# Patient Record
Sex: MALE | NOT HISPANIC OR LATINO | URBAN - METROPOLITAN AREA
[De-identification: names, ages, dates, MRNs, and addresses within clinical notes are randomized per-mention and may not be internally consistent; named-entity substitution may affect disease eponyms.]

---

## 2023-07-11 LAB
ALANINE AMINOTRANSFERASE (SGPT) (U/L) IN SER/PLAS: 37 U/L (ref 10–52)
ALBUMIN (G/DL) IN SER/PLAS: 4.4 G/DL (ref 3.4–5)
ALKALINE PHOSPHATASE (U/L) IN SER/PLAS: 79 U/L (ref 33–120)
ANION GAP IN SER/PLAS: 15 MMOL/L (ref 10–20)
ASPARTATE AMINOTRANSFERASE (SGOT) (U/L) IN SER/PLAS: 21 U/L (ref 9–39)
BASOPHILS (10*3/UL) IN BLOOD BY AUTOMATED COUNT: 0.06 X10E9/L (ref 0–0.1)
BASOPHILS/100 LEUKOCYTES IN BLOOD BY AUTOMATED COUNT: 1 % (ref 0–2)
BILIRUBIN TOTAL (MG/DL) IN SER/PLAS: 0.6 MG/DL (ref 0–1.2)
C REACTIVE PROTEIN (MG/L) IN SER/PLAS: 0.25 MG/DL
CALCIUM (MG/DL) IN SER/PLAS: 9.2 MG/DL (ref 8.6–10.3)
CARBON DIOXIDE, TOTAL (MMOL/L) IN SER/PLAS: 23 MMOL/L (ref 21–32)
CHLORIDE (MMOL/L) IN SER/PLAS: 100 MMOL/L (ref 98–107)
CHOLESTEROL (MG/DL) IN SER/PLAS: 201 MG/DL (ref 0–199)
CHOLESTEROL IN HDL (MG/DL) IN SER/PLAS: 50.4 MG/DL
CHOLESTEROL/HDL RATIO: 4
CREATININE (MG/DL) IN SER/PLAS: 1.02 MG/DL (ref 0.5–1.3)
EOSINOPHILS (10*3/UL) IN BLOOD BY AUTOMATED COUNT: 0.27 X10E9/L (ref 0–0.7)
EOSINOPHILS/100 LEUKOCYTES IN BLOOD BY AUTOMATED COUNT: 4.5 % (ref 0–6)
ERYTHROCYTE DISTRIBUTION WIDTH (RATIO) BY AUTOMATED COUNT: 13.1 % (ref 11.5–14.5)
ERYTHROCYTE MEAN CORPUSCULAR HEMOGLOBIN CONCENTRATION (G/DL) BY AUTOMATED: 32.1 G/DL (ref 32–36)
ERYTHROCYTE MEAN CORPUSCULAR VOLUME (FL) BY AUTOMATED COUNT: 81 FL (ref 80–100)
ERYTHROCYTES (10*6/UL) IN BLOOD BY AUTOMATED COUNT: 4.78 X10E12/L (ref 4.5–5.9)
FERRITIN (UG/LL) IN SER/PLAS: 160 UG/L (ref 20–300)
GFR MALE: 90 ML/MIN/1.73M2
GLUCOSE (MG/DL) IN SER/PLAS: 318 MG/DL (ref 74–99)
HEMATOCRIT (%) IN BLOOD BY AUTOMATED COUNT: 38.6 % (ref 41–52)
HEMOGLOBIN (G/DL) IN BLOOD: 12.4 G/DL (ref 13.5–17.5)
IMMATURE GRANULOCYTES/100 LEUKOCYTES IN BLOOD BY AUTOMATED COUNT: 0.2 % (ref 0–0.9)
LDL: 130 MG/DL (ref 0–99)
LEUKOCYTES (10*3/UL) IN BLOOD BY AUTOMATED COUNT: 6 X10E9/L (ref 4.4–11.3)
LYMPHOCYTES (10*3/UL) IN BLOOD BY AUTOMATED COUNT: 1.89 X10E9/L (ref 1.2–4.8)
LYMPHOCYTES/100 LEUKOCYTES IN BLOOD BY AUTOMATED COUNT: 31.4 % (ref 13–44)
MONOCYTES (10*3/UL) IN BLOOD BY AUTOMATED COUNT: 0.47 X10E9/L (ref 0.1–1)
MONOCYTES/100 LEUKOCYTES IN BLOOD BY AUTOMATED COUNT: 7.8 % (ref 2–10)
NATRIURETIC PEPTIDE B (PG/ML) IN SER/PLAS: 8 PG/ML (ref 0–99)
NEUTROPHILS (10*3/UL) IN BLOOD BY AUTOMATED COUNT: 3.31 X10E9/L (ref 1.2–7.7)
NEUTROPHILS/100 LEUKOCYTES IN BLOOD BY AUTOMATED COUNT: 55.1 % (ref 40–80)
PLATELETS (10*3/UL) IN BLOOD AUTOMATED COUNT: 247 X10E9/L (ref 150–450)
POTASSIUM (MMOL/L) IN SER/PLAS: 4.9 MMOL/L (ref 3.5–5.3)
PROTEIN TOTAL: 6.7 G/DL (ref 6.4–8.2)
SEDIMENTATION RATE, ERYTHROCYTE: 18 MM/H (ref 0–15)
SODIUM (MMOL/L) IN SER/PLAS: 133 MMOL/L (ref 136–145)
THYROTROPIN (MIU/L) IN SER/PLAS BY DETECTION LIMIT <= 0.05 MIU/L: 0.79 MIU/L (ref 0.44–3.98)
THYROXINE (T4) FREE (NG/DL) IN SER/PLAS: 0.94 NG/DL (ref 0.61–1.12)
TRIGLYCERIDE (MG/DL) IN SER/PLAS: 102 MG/DL (ref 0–149)
UREA NITROGEN (MG/DL) IN SER/PLAS: 23 MG/DL (ref 6–23)
VLDL: 20 MG/DL (ref 0–40)

## 2023-07-12 LAB
ANTI-NUCLEAR ANTIBODY (ANA): NEGATIVE
CALCIDIOL (25 OH VITAMIN D3) (NG/ML) IN SER/PLAS: 25 NG/ML
COBALAMIN (VITAMIN B12) (PG/ML) IN SER/PLAS: 514 PG/ML (ref 211–911)
EPSTEIN-BARR VCA IGG: NEGATIVE
EPSTEIN-BARR VCA IGM: NEGATIVE
ESTIMATED AVERAGE GLUCOSE FOR HBA1C: 329 MG/DL
HEMOGLOBIN A1C/HEMOGLOBIN TOTAL IN BLOOD: 13.1 %
PROSTATE SPECIFIC AG (NG/ML) IN SER/PLAS: 0.28 NG/ML (ref 0–4)
STREPTOLYSIN O AB (IU/ML) IN SER/PLAS: 109 IU/ML (ref 0–200)
TESTOSTERONE (NG/DL) IN SER/PLAS: 148 NG/DL (ref 240–1000)

## 2024-08-05 ENCOUNTER — LAB REQUISITION (OUTPATIENT)
Dept: LAB | Facility: HOSPITAL | Age: 49
End: 2024-08-05
Payer: MEDICARE

## 2024-08-05 DIAGNOSIS — G40.911 EPILEPSY, UNSPECIFIED, INTRACTABLE, WITH STATUS EPILEPTICUS (MULTI): ICD-10-CM

## 2024-08-05 DIAGNOSIS — R00.2 PALPITATIONS: ICD-10-CM

## 2024-08-05 DIAGNOSIS — E11.9 TYPE 2 DIABETES MELLITUS WITHOUT COMPLICATIONS (MULTI): ICD-10-CM

## 2024-08-05 DIAGNOSIS — I10 ESSENTIAL (PRIMARY) HYPERTENSION: ICD-10-CM

## 2024-08-05 DIAGNOSIS — E78.00 PURE HYPERCHOLESTEROLEMIA, UNSPECIFIED: ICD-10-CM

## 2024-08-05 LAB
ALBUMIN SERPL BCP-MCNC: 4.3 G/DL (ref 3.4–5)
ALP SERPL-CCNC: 81 U/L (ref 33–120)
ALT SERPL W P-5'-P-CCNC: 42 U/L (ref 10–52)
ANION GAP SERPL CALC-SCNC: 18 MMOL/L (ref 10–20)
AST SERPL W P-5'-P-CCNC: 28 U/L (ref 9–39)
BASOPHILS # BLD AUTO: 0.06 X10*3/UL (ref 0–0.1)
BASOPHILS NFR BLD AUTO: 0.7 %
BILIRUB SERPL-MCNC: 0.7 MG/DL (ref 0–1.2)
BNP SERPL-MCNC: 19 PG/ML (ref 0–99)
BUN SERPL-MCNC: 19 MG/DL (ref 6–23)
CALCIUM SERPL-MCNC: 9.2 MG/DL (ref 8.6–10.3)
CHLORIDE SERPL-SCNC: 98 MMOL/L (ref 98–107)
CHOLEST SERPL-MCNC: 213 MG/DL (ref 0–199)
CHOLESTEROL/HDL RATIO: 4.1
CK SERPL-CCNC: 116 U/L (ref 0–325)
CO2 SERPL-SCNC: 23 MMOL/L (ref 21–32)
CREAT SERPL-MCNC: 1.06 MG/DL (ref 0.5–1.3)
EGFRCR SERPLBLD CKD-EPI 2021: 85 ML/MIN/1.73M*2
EOSINOPHIL # BLD AUTO: 0.28 X10*3/UL (ref 0–0.7)
EOSINOPHIL NFR BLD AUTO: 3.3 %
ERYTHROCYTE [DISTWIDTH] IN BLOOD BY AUTOMATED COUNT: 14 % (ref 11.5–14.5)
FERRITIN SERPL-MCNC: 179 NG/ML (ref 20–300)
GLUCOSE SERPL-MCNC: 245 MG/DL (ref 74–99)
HCT VFR BLD AUTO: 38.1 % (ref 41–52)
HDLC SERPL-MCNC: 52.3 MG/DL
HGB BLD-MCNC: 12.4 G/DL (ref 13.5–17.5)
HOLD SPECIMEN: NORMAL
HOLD SPECIMEN: NORMAL
IMM GRANULOCYTES # BLD AUTO: 0.02 X10*3/UL (ref 0–0.7)
IMM GRANULOCYTES NFR BLD AUTO: 0.2 % (ref 0–0.9)
IRON SATN MFR SERPL: 20 % (ref 25–45)
IRON SERPL-MCNC: 71 UG/DL (ref 35–150)
LYMPHOCYTES # BLD AUTO: 2.2 X10*3/UL (ref 1.2–4.8)
LYMPHOCYTES NFR BLD AUTO: 26 %
MCH RBC QN AUTO: 26.6 PG (ref 26–34)
MCHC RBC AUTO-ENTMCNC: 32.5 G/DL (ref 32–36)
MCV RBC AUTO: 82 FL (ref 80–100)
MONOCYTES # BLD AUTO: 0.62 X10*3/UL (ref 0.1–1)
MONOCYTES NFR BLD AUTO: 7.3 %
NEUTROPHILS # BLD AUTO: 5.28 X10*3/UL (ref 1.2–7.7)
NEUTROPHILS NFR BLD AUTO: 62.5 %
NON-HDL CHOLESTEROL: 161 MG/DL (ref 0–149)
NRBC BLD-RTO: 0 /100 WBCS (ref 0–0)
PLATELET # BLD AUTO: 259 X10*3/UL (ref 150–450)
POTASSIUM SERPL-SCNC: 4.1 MMOL/L (ref 3.5–5.3)
PROT SERPL-MCNC: 6.6 G/DL (ref 6.4–8.2)
RBC # BLD AUTO: 4.67 X10*6/UL (ref 4.5–5.9)
SODIUM SERPL-SCNC: 135 MMOL/L (ref 136–145)
T4 FREE SERPL-MCNC: 1.05 NG/DL (ref 0.61–1.12)
TIBC SERPL-MCNC: 364 UG/DL (ref 240–445)
TSH SERPL-ACNC: 0.66 MIU/L (ref 0.44–3.98)
UIBC SERPL-MCNC: 293 UG/DL (ref 110–370)
WBC # BLD AUTO: 8.5 X10*3/UL (ref 4.4–11.3)

## 2024-08-05 PROCEDURE — 83718 ASSAY OF LIPOPROTEIN: CPT

## 2024-08-05 PROCEDURE — 83880 ASSAY OF NATRIURETIC PEPTIDE: CPT

## 2024-08-05 PROCEDURE — 84439 ASSAY OF FREE THYROXINE: CPT

## 2024-08-05 PROCEDURE — 83540 ASSAY OF IRON: CPT

## 2024-08-05 PROCEDURE — 83550 IRON BINDING TEST: CPT

## 2024-08-05 PROCEDURE — 80053 COMPREHEN METABOLIC PANEL: CPT

## 2024-08-05 PROCEDURE — 82552 ASSAY OF CPK IN BLOOD: CPT

## 2024-08-05 PROCEDURE — 85025 COMPLETE CBC W/AUTO DIFF WBC: CPT

## 2024-08-05 PROCEDURE — 82550 ASSAY OF CK (CPK): CPT

## 2024-08-05 PROCEDURE — 83036 HEMOGLOBIN GLYCOSYLATED A1C: CPT

## 2024-08-05 PROCEDURE — 86060 ANTISTREPTOLYSIN O TITER: CPT

## 2024-08-05 PROCEDURE — 84443 ASSAY THYROID STIM HORMONE: CPT

## 2024-08-05 PROCEDURE — 82465 ASSAY BLD/SERUM CHOLESTEROL: CPT

## 2024-08-05 PROCEDURE — 82728 ASSAY OF FERRITIN: CPT

## 2024-08-06 LAB
ASO AB SERPL-ACNC: 99 IU/ML (ref 0–250)
EST. AVERAGE GLUCOSE BLD GHB EST-MCNC: 292 MG/DL
HBA1C MFR BLD: 11.8 %

## 2024-08-08 LAB
CK BB CFR SERPL ELPH: 0 % (ref 0–0)
CK MACRO1 CFR SERPL: 0 % (ref 0–0)
CK MACRO2 CFR SERPL: 0 % (ref 0–0)
CK MB CFR SERPL ELPH: 0 % (ref 0–4)
CK MM CFR SERPL ELPH: 100 % (ref 96–100)
CK SERPL-CCNC: 124 U/L (ref 39–308)

## 2025-06-28 ENCOUNTER — APPOINTMENT (OUTPATIENT)
Dept: CARDIOLOGY | Facility: HOSPITAL | Age: 50
DRG: 309 | End: 2025-06-28
Payer: MEDICARE

## 2025-06-28 ENCOUNTER — APPOINTMENT (OUTPATIENT)
Dept: RADIOLOGY | Facility: HOSPITAL | Age: 50
DRG: 309 | End: 2025-06-28
Payer: MEDICARE

## 2025-06-28 ENCOUNTER — HOSPITAL ENCOUNTER (INPATIENT)
Facility: HOSPITAL | Age: 50
DRG: 309 | End: 2025-06-28
Attending: STUDENT IN AN ORGANIZED HEALTH CARE EDUCATION/TRAINING PROGRAM | Admitting: INTERNAL MEDICINE
Payer: MEDICARE

## 2025-06-28 DIAGNOSIS — E11.9 TYPE 2 DIABETES MELLITUS WITHOUT COMPLICATION, WITHOUT LONG-TERM CURRENT USE OF INSULIN: ICD-10-CM

## 2025-06-28 DIAGNOSIS — I48.19 ATRIAL FIBRILLATION, PERSISTENT (MULTI): ICD-10-CM

## 2025-06-28 DIAGNOSIS — T67.5XXA HEAT EXHAUSTION, INITIAL ENCOUNTER: ICD-10-CM

## 2025-06-28 DIAGNOSIS — I48.0 AF (PAROXYSMAL ATRIAL FIBRILLATION) (MULTI): ICD-10-CM

## 2025-06-28 DIAGNOSIS — I48.91 ATRIAL FIBRILLATION WITH RVR (MULTI): ICD-10-CM

## 2025-06-28 DIAGNOSIS — I48.11 LONGSTANDING PERSISTENT ATRIAL FIBRILLATION (MULTI): ICD-10-CM

## 2025-06-28 DIAGNOSIS — I48.91 ATRIAL FIBRILLATION WITH RAPID VENTRICULAR RESPONSE (MULTI): Primary | ICD-10-CM

## 2025-06-28 LAB
ALBUMIN SERPL BCP-MCNC: 4.7 G/DL (ref 3.4–5)
ALP SERPL-CCNC: 72 U/L (ref 33–120)
ALT SERPL W P-5'-P-CCNC: 51 U/L (ref 10–52)
ANION GAP BLDV CALCULATED.4IONS-SCNC: 15 MMOL/L (ref 10–25)
ANION GAP SERPL CALC-SCNC: 22 MMOL/L (ref 10–20)
AST SERPL W P-5'-P-CCNC: 31 U/L (ref 9–39)
BASE EXCESS BLDV CALC-SCNC: -1.1 MMOL/L (ref -2–3)
BASOPHILS # BLD AUTO: 0.07 X10*3/UL (ref 0–0.1)
BASOPHILS NFR BLD AUTO: 0.7 %
BILIRUB SERPL-MCNC: 0.6 MG/DL (ref 0–1.2)
BNP SERPL-MCNC: 86 PG/ML (ref 0–99)
BODY TEMPERATURE: ABNORMAL
BUN SERPL-MCNC: 16 MG/DL (ref 6–23)
CA-I BLDV-SCNC: 1.19 MMOL/L (ref 1.1–1.33)
CALCIUM SERPL-MCNC: 9.9 MG/DL (ref 8.6–10.3)
CARDIAC TROPONIN I PNL SERPL HS: 17 NG/L (ref 0–20)
CARDIAC TROPONIN I PNL SERPL HS: 23 NG/L (ref 0–20)
CHLORIDE BLDV-SCNC: 103 MMOL/L (ref 98–107)
CHLORIDE SERPL-SCNC: 101 MMOL/L (ref 98–107)
CO2 SERPL-SCNC: 18 MMOL/L (ref 21–32)
CREAT SERPL-MCNC: 1.03 MG/DL (ref 0.5–1.3)
EGFRCR SERPLBLD CKD-EPI 2021: 88 ML/MIN/1.73M*2
EOSINOPHIL # BLD AUTO: 0.25 X10*3/UL (ref 0–0.7)
EOSINOPHIL NFR BLD AUTO: 2.6 %
ERYTHROCYTE [DISTWIDTH] IN BLOOD BY AUTOMATED COUNT: 13.5 % (ref 11.5–14.5)
GLUCOSE BLDV-MCNC: 236 MG/DL (ref 74–99)
GLUCOSE SERPL-MCNC: 203 MG/DL (ref 74–99)
HCO3 BLDV-SCNC: 22.2 MMOL/L (ref 22–26)
HCT VFR BLD AUTO: 36.9 % (ref 41–52)
HCT VFR BLD EST: 37 % (ref 41–52)
HGB BLD-MCNC: 12.5 G/DL (ref 13.5–17.5)
HGB BLDV-MCNC: 12.4 G/DL (ref 13.5–17.5)
IMM GRANULOCYTES # BLD AUTO: 0.02 X10*3/UL (ref 0–0.7)
IMM GRANULOCYTES NFR BLD AUTO: 0.2 % (ref 0–0.9)
INHALED O2 CONCENTRATION: 21 %
LACTATE BLDV-SCNC: 2.2 MMOL/L (ref 0.4–2)
LIPASE SERPL-CCNC: 52 U/L (ref 9–82)
LYMPHOCYTES # BLD AUTO: 2.53 X10*3/UL (ref 1.2–4.8)
LYMPHOCYTES NFR BLD AUTO: 26.1 %
MCH RBC QN AUTO: 26.8 PG (ref 26–34)
MCHC RBC AUTO-ENTMCNC: 33.9 G/DL (ref 32–36)
MCV RBC AUTO: 79 FL (ref 80–100)
MONOCYTES # BLD AUTO: 0.78 X10*3/UL (ref 0.1–1)
MONOCYTES NFR BLD AUTO: 8 %
NEUTROPHILS # BLD AUTO: 6.04 X10*3/UL (ref 1.2–7.7)
NEUTROPHILS NFR BLD AUTO: 62.4 %
NRBC BLD-RTO: 0 /100 WBCS (ref 0–0)
OXYHGB MFR BLDV: 79.1 % (ref 45–75)
PCO2 BLDV: 32 MM HG (ref 41–51)
PH BLDV: 7.45 PH (ref 7.33–7.43)
PLATELET # BLD AUTO: 268 X10*3/UL (ref 150–450)
PO2 BLDV: 44 MM HG (ref 35–45)
POTASSIUM BLDV-SCNC: 4.1 MMOL/L (ref 3.5–5.3)
POTASSIUM SERPL-SCNC: 3.7 MMOL/L (ref 3.5–5.3)
PROT SERPL-MCNC: 7.1 G/DL (ref 6.4–8.2)
RBC # BLD AUTO: 4.67 X10*6/UL (ref 4.5–5.9)
SAO2 % BLDV: 81 % (ref 45–75)
SODIUM BLDV-SCNC: 136 MMOL/L (ref 136–145)
SODIUM SERPL-SCNC: 137 MMOL/L (ref 136–145)
WBC # BLD AUTO: 9.7 X10*3/UL (ref 4.4–11.3)

## 2025-06-28 PROCEDURE — 93005 ELECTROCARDIOGRAM TRACING: CPT

## 2025-06-28 PROCEDURE — 71275 CT ANGIOGRAPHY CHEST: CPT

## 2025-06-28 PROCEDURE — 84484 ASSAY OF TROPONIN QUANT: CPT

## 2025-06-28 PROCEDURE — 72125 CT NECK SPINE W/O DYE: CPT

## 2025-06-28 PROCEDURE — 36415 COLL VENOUS BLD VENIPUNCTURE: CPT

## 2025-06-28 PROCEDURE — 84132 ASSAY OF SERUM POTASSIUM: CPT

## 2025-06-28 PROCEDURE — 70450 CT HEAD/BRAIN W/O DYE: CPT | Performed by: RADIOLOGY

## 2025-06-28 PROCEDURE — 71275 CT ANGIOGRAPHY CHEST: CPT | Performed by: RADIOLOGY

## 2025-06-28 PROCEDURE — 72125 CT NECK SPINE W/O DYE: CPT | Performed by: RADIOLOGY

## 2025-06-28 PROCEDURE — 70450 CT HEAD/BRAIN W/O DYE: CPT

## 2025-06-28 PROCEDURE — 2550000001 HC RX 255 CONTRASTS: Performed by: STUDENT IN AN ORGANIZED HEALTH CARE EDUCATION/TRAINING PROGRAM

## 2025-06-28 PROCEDURE — 74177 CT ABD & PELVIS W/CONTRAST: CPT

## 2025-06-28 PROCEDURE — 2500000004 HC RX 250 GENERAL PHARMACY W/ HCPCS (ALT 636 FOR OP/ED)

## 2025-06-28 PROCEDURE — 96375 TX/PRO/DX INJ NEW DRUG ADDON: CPT

## 2025-06-28 PROCEDURE — 84075 ASSAY ALKALINE PHOSPHATASE: CPT

## 2025-06-28 PROCEDURE — 99285 EMERGENCY DEPT VISIT HI MDM: CPT | Mod: 25 | Performed by: STUDENT IN AN ORGANIZED HEALTH CARE EDUCATION/TRAINING PROGRAM

## 2025-06-28 PROCEDURE — 96361 HYDRATE IV INFUSION ADD-ON: CPT

## 2025-06-28 PROCEDURE — 83880 ASSAY OF NATRIURETIC PEPTIDE: CPT

## 2025-06-28 PROCEDURE — 96374 THER/PROPH/DIAG INJ IV PUSH: CPT

## 2025-06-28 PROCEDURE — 83690 ASSAY OF LIPASE: CPT

## 2025-06-28 PROCEDURE — 74177 CT ABD & PELVIS W/CONTRAST: CPT | Performed by: RADIOLOGY

## 2025-06-28 PROCEDURE — 84484 ASSAY OF TROPONIN QUANT: CPT | Performed by: STUDENT IN AN ORGANIZED HEALTH CARE EDUCATION/TRAINING PROGRAM

## 2025-06-28 PROCEDURE — 85025 COMPLETE CBC W/AUTO DIFF WBC: CPT

## 2025-06-28 RX ORDER — METOPROLOL TARTRATE 1 MG/ML
5 INJECTION, SOLUTION INTRAVENOUS
Status: DISPENSED | OUTPATIENT
Start: 2025-06-28 | End: 2025-06-28

## 2025-06-28 RX ORDER — HEPARIN SODIUM 10000 [USP'U]/100ML
0-4500 INJECTION, SOLUTION INTRAVENOUS CONTINUOUS
Status: DISCONTINUED | OUTPATIENT
Start: 2025-06-28 | End: 2025-06-30 | Stop reason: HOSPADM

## 2025-06-28 RX ORDER — MORPHINE SULFATE 4 MG/ML
4 INJECTION INTRAVENOUS ONCE
Status: COMPLETED | OUTPATIENT
Start: 2025-06-28 | End: 2025-06-28

## 2025-06-28 RX ADMIN — IOHEXOL 75 ML: 350 INJECTION, SOLUTION INTRAVENOUS at 21:17

## 2025-06-28 RX ADMIN — METOPROLOL TARTRATE 5 MG: 5 INJECTION INTRAVENOUS at 20:37

## 2025-06-28 RX ADMIN — MORPHINE SULFATE 4 MG: 4 INJECTION INTRAVENOUS at 21:39

## 2025-06-28 RX ADMIN — SODIUM CHLORIDE, POTASSIUM CHLORIDE, SODIUM LACTATE AND CALCIUM CHLORIDE 1000 ML: 600; 310; 30; 20 INJECTION, SOLUTION INTRAVENOUS at 20:27

## 2025-06-28 ASSESSMENT — PAIN SCALES - GENERAL
PAINLEVEL_OUTOF10: 3
PAINLEVEL_OUTOF10: 7

## 2025-06-28 ASSESSMENT — PAIN DESCRIPTION - DESCRIPTORS
DESCRIPTORS: TIGHTNESS
DESCRIPTORS: TIGHTNESS

## 2025-06-28 ASSESSMENT — PAIN DESCRIPTION - PROGRESSION: CLINICAL_PROGRESSION: GRADUALLY IMPROVING

## 2025-06-28 ASSESSMENT — PAIN - FUNCTIONAL ASSESSMENT: PAIN_FUNCTIONAL_ASSESSMENT: 0-10

## 2025-06-28 ASSESSMENT — PAIN DESCRIPTION - PAIN TYPE: TYPE: ACUTE PAIN

## 2025-06-28 ASSESSMENT — PAIN DESCRIPTION - LOCATION: LOCATION: CHEST

## 2025-06-29 VITALS
TEMPERATURE: 96.8 F | HEIGHT: 70 IN | HEART RATE: 84 BPM | SYSTOLIC BLOOD PRESSURE: 142 MMHG | DIASTOLIC BLOOD PRESSURE: 85 MMHG | WEIGHT: 227.8 LBS | OXYGEN SATURATION: 96 % | BODY MASS INDEX: 32.61 KG/M2 | RESPIRATION RATE: 18 BRPM

## 2025-06-29 PROBLEM — I48.91 ATRIAL FIBRILLATION WITH RAPID VENTRICULAR RESPONSE (MULTI): Status: ACTIVE | Noted: 2025-06-29

## 2025-06-29 PROBLEM — I48.91 ATRIAL FIBRILLATION WITH RVR (MULTI): Status: ACTIVE | Noted: 2025-06-29

## 2025-06-29 LAB
ANION GAP SERPL CALC-SCNC: 13 MMOL/L (ref 10–20)
BUN SERPL-MCNC: 15 MG/DL (ref 6–23)
CALCIUM SERPL-MCNC: 8.8 MG/DL (ref 8.6–10.3)
CARDIAC TROPONIN I PNL SERPL HS: 28 NG/L (ref 0–20)
CARDIAC TROPONIN I PNL SERPL HS: 30 NG/L (ref 0–20)
CHLORIDE SERPL-SCNC: 104 MMOL/L (ref 98–107)
CO2 SERPL-SCNC: 25 MMOL/L (ref 21–32)
CREAT SERPL-MCNC: 0.89 MG/DL (ref 0.5–1.3)
EGFRCR SERPLBLD CKD-EPI 2021: >90 ML/MIN/1.73M*2
ERYTHROCYTE [DISTWIDTH] IN BLOOD BY AUTOMATED COUNT: 13.5 % (ref 11.5–14.5)
EST. AVERAGE GLUCOSE BLD GHB EST-MCNC: 192 MG/DL
GLUCOSE BLD MANUAL STRIP-MCNC: 177 MG/DL (ref 74–99)
GLUCOSE BLD MANUAL STRIP-MCNC: 180 MG/DL (ref 74–99)
GLUCOSE BLD MANUAL STRIP-MCNC: 268 MG/DL (ref 74–99)
GLUCOSE SERPL-MCNC: 131 MG/DL (ref 74–99)
HBA1C MFR BLD: 8.3 % (ref ?–5.7)
HCT VFR BLD AUTO: 35.1 % (ref 41–52)
HGB BLD-MCNC: 12 G/DL (ref 13.5–17.5)
HOLD SPECIMEN: NORMAL
INR PPP: 1.1 (ref 0.9–1.1)
LACTATE BLDV-SCNC: 1.1 MMOL/L (ref 0.4–2)
MAGNESIUM SERPL-MCNC: 1.55 MG/DL (ref 1.6–2.4)
MCH RBC QN AUTO: 27.4 PG (ref 26–34)
MCHC RBC AUTO-ENTMCNC: 34.2 G/DL (ref 32–36)
MCV RBC AUTO: 80 FL (ref 80–100)
NRBC BLD-RTO: 0 /100 WBCS (ref 0–0)
PLATELET # BLD AUTO: 230 X10*3/UL (ref 150–450)
POTASSIUM SERPL-SCNC: 3.3 MMOL/L (ref 3.5–5.3)
PROTHROMBIN TIME: 12.1 SECONDS (ref 9.8–12.4)
RBC # BLD AUTO: 4.38 X10*6/UL (ref 4.5–5.9)
SODIUM SERPL-SCNC: 139 MMOL/L (ref 136–145)
TSH SERPL-ACNC: 1.83 MIU/L (ref 0.44–3.98)
UFH PPP CHRO-ACNC: 0.1 IU/ML (ref ?–1.1)
UFH PPP CHRO-ACNC: 0.3 IU/ML (ref ?–1.1)
UFH PPP CHRO-ACNC: 0.5 IU/ML (ref ?–1.1)
UFH PPP CHRO-ACNC: 0.8 IU/ML (ref ?–1.1)
UFH PPP CHRO-ACNC: 1.3 IU/ML (ref ?–1.1)
WBC # BLD AUTO: 9.3 X10*3/UL (ref 4.4–11.3)

## 2025-06-29 PROCEDURE — 2500000004 HC RX 250 GENERAL PHARMACY W/ HCPCS (ALT 636 FOR OP/ED): Performed by: INTERNAL MEDICINE

## 2025-06-29 PROCEDURE — 36415 COLL VENOUS BLD VENIPUNCTURE: CPT | Performed by: INTERNAL MEDICINE

## 2025-06-29 PROCEDURE — 84443 ASSAY THYROID STIM HORMONE: CPT | Performed by: INTERNAL MEDICINE

## 2025-06-29 PROCEDURE — 83735 ASSAY OF MAGNESIUM: CPT | Performed by: INTERNAL MEDICINE

## 2025-06-29 PROCEDURE — 2500000002 HC RX 250 W HCPCS SELF ADMINISTERED DRUGS (ALT 637 FOR MEDICARE OP, ALT 636 FOR OP/ED): Performed by: INTERNAL MEDICINE

## 2025-06-29 PROCEDURE — 85610 PROTHROMBIN TIME: CPT | Performed by: STUDENT IN AN ORGANIZED HEALTH CARE EDUCATION/TRAINING PROGRAM

## 2025-06-29 PROCEDURE — 99223 1ST HOSP IP/OBS HIGH 75: CPT | Performed by: INTERNAL MEDICINE

## 2025-06-29 PROCEDURE — 83605 ASSAY OF LACTIC ACID: CPT | Performed by: INTERNAL MEDICINE

## 2025-06-29 PROCEDURE — 85520 HEPARIN ASSAY: CPT | Performed by: INTERNAL MEDICINE

## 2025-06-29 PROCEDURE — 2500000001 HC RX 250 WO HCPCS SELF ADMINISTERED DRUGS (ALT 637 FOR MEDICARE OP): Performed by: INTERNAL MEDICINE

## 2025-06-29 PROCEDURE — 1200000002 HC GENERAL ROOM WITH TELEMETRY DAILY

## 2025-06-29 PROCEDURE — 2500000004 HC RX 250 GENERAL PHARMACY W/ HCPCS (ALT 636 FOR OP/ED): Performed by: STUDENT IN AN ORGANIZED HEALTH CARE EDUCATION/TRAINING PROGRAM

## 2025-06-29 PROCEDURE — 84484 ASSAY OF TROPONIN QUANT: CPT | Performed by: INTERNAL MEDICINE

## 2025-06-29 PROCEDURE — 83036 HEMOGLOBIN GLYCOSYLATED A1C: CPT | Mod: GEALAB | Performed by: INTERNAL MEDICINE

## 2025-06-29 PROCEDURE — 96365 THER/PROPH/DIAG IV INF INIT: CPT

## 2025-06-29 PROCEDURE — 82947 ASSAY GLUCOSE BLOOD QUANT: CPT

## 2025-06-29 PROCEDURE — 85520 HEPARIN ASSAY: CPT | Performed by: NURSE PRACTITIONER

## 2025-06-29 PROCEDURE — 85027 COMPLETE CBC AUTOMATED: CPT | Performed by: INTERNAL MEDICINE

## 2025-06-29 PROCEDURE — 80048 BASIC METABOLIC PNL TOTAL CA: CPT | Performed by: INTERNAL MEDICINE

## 2025-06-29 PROCEDURE — 36415 COLL VENOUS BLD VENIPUNCTURE: CPT | Performed by: STUDENT IN AN ORGANIZED HEALTH CARE EDUCATION/TRAINING PROGRAM

## 2025-06-29 RX ORDER — ACETAMINOPHEN 325 MG/1
650 TABLET ORAL EVERY 6 HOURS PRN
Status: DISCONTINUED | OUTPATIENT
Start: 2025-06-29 | End: 2025-06-30 | Stop reason: HOSPADM

## 2025-06-29 RX ORDER — INSULIN LISPRO 100 [IU]/ML
0-10 INJECTION, SOLUTION INTRAVENOUS; SUBCUTANEOUS
Status: DISCONTINUED | OUTPATIENT
Start: 2025-06-29 | End: 2025-06-30 | Stop reason: HOSPADM

## 2025-06-29 RX ORDER — METOPROLOL TARTRATE 25 MG/1
25 TABLET, FILM COATED ORAL 2 TIMES DAILY
Status: DISCONTINUED | OUTPATIENT
Start: 2025-06-29 | End: 2025-06-30 | Stop reason: HOSPADM

## 2025-06-29 RX ORDER — DEXTROSE 50 % IN WATER (D50W) INTRAVENOUS SYRINGE
12.5
Status: DISCONTINUED | OUTPATIENT
Start: 2025-06-29 | End: 2025-06-30 | Stop reason: HOSPADM

## 2025-06-29 RX ORDER — DEXTROSE 50 % IN WATER (D50W) INTRAVENOUS SYRINGE
25
Status: DISCONTINUED | OUTPATIENT
Start: 2025-06-29 | End: 2025-06-30 | Stop reason: HOSPADM

## 2025-06-29 RX ORDER — MAGNESIUM SULFATE HEPTAHYDRATE 40 MG/ML
2 INJECTION, SOLUTION INTRAVENOUS ONCE
Status: COMPLETED | OUTPATIENT
Start: 2025-06-29 | End: 2025-06-29

## 2025-06-29 RX ADMIN — MAGNESIUM SULFATE HEPTAHYDRATE 2 G: 2 INJECTION, SOLUTION INTRAVENOUS at 09:49

## 2025-06-29 RX ADMIN — ACETAMINOPHEN 650 MG: 325 TABLET ORAL at 01:59

## 2025-06-29 RX ADMIN — HEPARIN SODIUM 2000 UNITS/HR: 10000 INJECTION, SOLUTION INTRAVENOUS at 00:38

## 2025-06-29 RX ADMIN — HEPARIN SODIUM 1700 UNITS/HR: 10000 INJECTION, SOLUTION INTRAVENOUS at 11:31

## 2025-06-29 RX ADMIN — INSULIN LISPRO 2 UNITS: 100 INJECTION, SOLUTION INTRAVENOUS; SUBCUTANEOUS at 16:51

## 2025-06-29 RX ADMIN — HEPARIN SODIUM 1700 UNITS/HR: 10000 INJECTION, SOLUTION INTRAVENOUS at 08:57

## 2025-06-29 RX ADMIN — INSULIN LISPRO 2 UNITS: 100 INJECTION, SOLUTION INTRAVENOUS; SUBCUTANEOUS at 20:27

## 2025-06-29 RX ADMIN — METOPROLOL TARTRATE 25 MG: 25 TABLET, FILM COATED ORAL at 09:49

## 2025-06-29 RX ADMIN — METOPROLOL TARTRATE 25 MG: 25 TABLET, FILM COATED ORAL at 20:27

## 2025-06-29 RX ADMIN — INSULIN LISPRO 6 UNITS: 100 INJECTION, SOLUTION INTRAVENOUS; SUBCUTANEOUS at 11:30

## 2025-06-29 SDOH — ECONOMIC STABILITY: TRANSPORTATION INSECURITY: IN THE PAST 12 MONTHS, HAS LACK OF TRANSPORTATION KEPT YOU FROM MEDICAL APPOINTMENTS OR FROM GETTING MEDICATIONS?: NO

## 2025-06-29 SDOH — SOCIAL STABILITY: SOCIAL INSECURITY: DO YOU FEEL ANYONE HAS EXPLOITED OR TAKEN ADVANTAGE OF YOU FINANCIALLY OR OF YOUR PERSONAL PROPERTY?: NO

## 2025-06-29 SDOH — ECONOMIC STABILITY: FOOD INSECURITY: WITHIN THE PAST 12 MONTHS, YOU WORRIED THAT YOUR FOOD WOULD RUN OUT BEFORE YOU GOT THE MONEY TO BUY MORE.: NEVER TRUE

## 2025-06-29 SDOH — SOCIAL STABILITY: SOCIAL INSECURITY: DOES ANYONE TRY TO KEEP YOU FROM HAVING/CONTACTING OTHER FRIENDS OR DOING THINGS OUTSIDE YOUR HOME?: NO

## 2025-06-29 SDOH — ECONOMIC STABILITY: INCOME INSECURITY: IN THE PAST 12 MONTHS HAS THE ELECTRIC, GAS, OIL, OR WATER COMPANY THREATENED TO SHUT OFF SERVICES IN YOUR HOME?: NO

## 2025-06-29 SDOH — ECONOMIC STABILITY: FOOD INSECURITY: HOW HARD IS IT FOR YOU TO PAY FOR THE VERY BASICS LIKE FOOD, HOUSING, MEDICAL CARE, AND HEATING?: PATIENT DECLINED

## 2025-06-29 SDOH — SOCIAL STABILITY: SOCIAL INSECURITY: HAS ANYONE EVER THREATENED TO HURT YOUR FAMILY OR YOUR PETS?: NO

## 2025-06-29 SDOH — ECONOMIC STABILITY: HOUSING INSECURITY: IN THE LAST 12 MONTHS, WAS THERE A TIME WHEN YOU WERE NOT ABLE TO PAY THE MORTGAGE OR RENT ON TIME?: PATIENT DECLINED

## 2025-06-29 SDOH — SOCIAL STABILITY: SOCIAL INSECURITY
WITHIN THE LAST YEAR, HAVE YOU BEEN RAPED OR FORCED TO HAVE ANY KIND OF SEXUAL ACTIVITY BY YOUR PARTNER OR EX-PARTNER?: NO

## 2025-06-29 SDOH — ECONOMIC STABILITY: HOUSING INSECURITY: AT ANY TIME IN THE PAST 12 MONTHS, WERE YOU HOMELESS OR LIVING IN A SHELTER (INCLUDING NOW)?: NO

## 2025-06-29 SDOH — SOCIAL STABILITY: SOCIAL INSECURITY: WITHIN THE LAST YEAR, HAVE YOU BEEN HUMILIATED OR EMOTIONALLY ABUSED IN OTHER WAYS BY YOUR PARTNER OR EX-PARTNER?: NO

## 2025-06-29 SDOH — SOCIAL STABILITY: SOCIAL INSECURITY
WITHIN THE LAST YEAR, HAVE YOU BEEN KICKED, HIT, SLAPPED, OR OTHERWISE PHYSICALLY HURT BY YOUR PARTNER OR EX-PARTNER?: NO

## 2025-06-29 SDOH — ECONOMIC STABILITY: HOUSING INSECURITY: IN THE PAST 12 MONTHS, HOW MANY TIMES HAVE YOU MOVED WHERE YOU WERE LIVING?: 1

## 2025-06-29 SDOH — ECONOMIC STABILITY: FOOD INSECURITY: WITHIN THE PAST 12 MONTHS, THE FOOD YOU BOUGHT JUST DIDN'T LAST AND YOU DIDN'T HAVE MONEY TO GET MORE.: NEVER TRUE

## 2025-06-29 SDOH — SOCIAL STABILITY: SOCIAL INSECURITY: WITHIN THE LAST YEAR, HAVE YOU BEEN AFRAID OF YOUR PARTNER OR EX-PARTNER?: NO

## 2025-06-29 SDOH — SOCIAL STABILITY: SOCIAL INSECURITY: HAVE YOU HAD THOUGHTS OF HARMING ANYONE ELSE?: NO

## 2025-06-29 SDOH — SOCIAL STABILITY: SOCIAL INSECURITY: ABUSE: ADULT

## 2025-06-29 SDOH — SOCIAL STABILITY: SOCIAL INSECURITY: WERE YOU ABLE TO COMPLETE ALL THE BEHAVIORAL HEALTH SCREENINGS?: YES

## 2025-06-29 SDOH — SOCIAL STABILITY: SOCIAL INSECURITY: ARE THERE ANY APPARENT SIGNS OF INJURIES/BEHAVIORS THAT COULD BE RELATED TO ABUSE/NEGLECT?: NO

## 2025-06-29 SDOH — SOCIAL STABILITY: SOCIAL INSECURITY: HAVE YOU HAD ANY THOUGHTS OF HARMING ANYONE ELSE?: NO

## 2025-06-29 SDOH — SOCIAL STABILITY: SOCIAL INSECURITY: DO YOU FEEL UNSAFE GOING BACK TO THE PLACE WHERE YOU ARE LIVING?: NO

## 2025-06-29 SDOH — SOCIAL STABILITY: SOCIAL INSECURITY: ARE YOU OR HAVE YOU BEEN THREATENED OR ABUSED PHYSICALLY, EMOTIONALLY, OR SEXUALLY BY ANYONE?: NO

## 2025-06-29 ASSESSMENT — ACTIVITIES OF DAILY LIVING (ADL)
WALKS IN HOME: INDEPENDENT
JUDGMENT_ADEQUATE_SAFELY_COMPLETE_DAILY_ACTIVITIES: YES
HEARING - RIGHT EAR: DEAF
PATIENT'S MEMORY ADEQUATE TO SAFELY COMPLETE DAILY ACTIVITIES?: YES
LACK_OF_TRANSPORTATION: NO
ADEQUATE_TO_COMPLETE_ADL: YES
HEARING - LEFT EAR: DEAF
DRESSING YOURSELF: INDEPENDENT
GROOMING: INDEPENDENT
BATHING: INDEPENDENT
LACK_OF_TRANSPORTATION: NO
TOILETING: INDEPENDENT
LACK_OF_TRANSPORTATION: NO
FEEDING YOURSELF: INDEPENDENT

## 2025-06-29 ASSESSMENT — COGNITIVE AND FUNCTIONAL STATUS - GENERAL
MOBILITY SCORE: 24
PATIENT BASELINE BEDBOUND: NO
DAILY ACTIVITIY SCORE: 24

## 2025-06-29 ASSESSMENT — ENCOUNTER SYMPTOMS
NEUROLOGICAL NEGATIVE: 1
ALLERGIC/IMMUNOLOGIC NEGATIVE: 1
ENDOCRINE COMMENTS: PT IS DIABETIC
GASTROINTESTINAL NEGATIVE: 1
MUSCULOSKELETAL NEGATIVE: 1
PALPITATIONS: 1
PSYCHIATRIC NEGATIVE: 1
EYES NEGATIVE: 1
SHORTNESS OF BREATH: 1
DIAPHORESIS: 1
HEMATOLOGIC/LYMPHATIC NEGATIVE: 1

## 2025-06-29 ASSESSMENT — PATIENT HEALTH QUESTIONNAIRE - PHQ9
SUM OF ALL RESPONSES TO PHQ9 QUESTIONS 1 & 2: 0
1. LITTLE INTEREST OR PLEASURE IN DOING THINGS: NOT AT ALL
2. FEELING DOWN, DEPRESSED OR HOPELESS: NOT AT ALL

## 2025-06-29 ASSESSMENT — LIFESTYLE VARIABLES
HOW OFTEN DO YOU HAVE 6 OR MORE DRINKS ON ONE OCCASION: NEVER
HOW MANY STANDARD DRINKS CONTAINING ALCOHOL DO YOU HAVE ON A TYPICAL DAY: PATIENT DOES NOT DRINK
AUDIT-C TOTAL SCORE: 0
SKIP TO QUESTIONS 9-10: 1
AUDIT-C TOTAL SCORE: 0
HOW OFTEN DO YOU HAVE A DRINK CONTAINING ALCOHOL: NEVER

## 2025-06-29 ASSESSMENT — PAIN - FUNCTIONAL ASSESSMENT
PAIN_FUNCTIONAL_ASSESSMENT: 0-10
PAIN_FUNCTIONAL_ASSESSMENT: 0-10

## 2025-06-29 ASSESSMENT — PAIN SCALES - GENERAL
PAINLEVEL_OUTOF10: 0 - NO PAIN
PAINLEVEL_OUTOF10: 0 - NO PAIN

## 2025-06-29 NOTE — CARE PLAN
The patient's goals for the shift include      The clinical goals for the shift include no falls    Problem: Safety - Adult  Goal: Free from fall injury  Outcome: Progressing

## 2025-06-29 NOTE — CONSULTS
"Inpatient consult to Cardiology  Consult performed by: Lorenzo Pizarro MD  Consult ordered by: Robbin Tan MD  Reason for consult: atrial fibrillation        History Of Present Illness:    Taqueria Delgado is a 50 y.o. male presenting with chest discomfort found to be in new atrial fibrillation.  He has hearing loss and communicates via sign language, history of diabetes and epilepsy.  Apparently had been in his lawn and got dizzy and had some diaphoresis and chest pain.  Patient was brought to the ER and found to be in atrial fibrillation.  Denies any current chest pain or complaints.     Last Recorded Vitals:  Vitals:    06/29/25 0231 06/29/25 0750 06/29/25 0949 06/29/25 1229   BP:  131/87 108/70 128/85   BP Location:  Right arm  Left arm   Pulse:  86 (!) 116 63   Resp:  17  18   Temp:  35.9 °C (96.6 °F)  36.6 °C (97.9 °F)   TempSrc:  Temporal  Temporal   SpO2:  97%  96%   Weight: 103 kg (227 lb 12.8 oz)      Height: 1.778 m (5' 10\")          Last Labs:  CBC - 6/29/2025:  6:19 AM  9.3 12.0 230    35.1      CMP - 6/29/2025:  6:19 AM  8.8 7.1 31 --- 0.6   _ 4.7 51 72      PTT - No results in last year.  1.1   12.1 _     Troponin I, High Sensitivity   Date/Time Value Ref Range Status   06/29/2025 06:19 AM 30 (H) 0 - 20 ng/L Final   06/28/2025 11:52 PM 28 (H) 0 - 20 ng/L Final   06/28/2025 09:34 PM 23 (H) 0 - 20 ng/L Final     BNP   Date/Time Value Ref Range Status   06/28/2025 08:11 PM 86 0 - 99 pg/mL Final   08/05/2024 01:50 PM 19 0 - 99 pg/mL Final     Hemoglobin A1C   Date/Time Value Ref Range Status   08/05/2024 01:50 PM 11.8 (H) see below % Final   07/11/2023 12:00 PM 13.1 (A) % Final     Comment:          Diagnosis of Diabetes-Adults   Non-Diabetic: < or = 5.6%   Increased risk for developing diabetes: 5.7-6.4%   Diagnostic of diabetes: > or = 6.5%  .       Monitoring of Diabetes                Age (y)     Therapeutic Goal (%)   Adults:          >18           <7.0   Pediatrics:    13-18           " <7.5                   7-12           <8.0                   0- 6            7.5-8.5   American Diabetes Association. Diabetes Care 33(S1), Jan 2010.       VLDL   Date/Time Value Ref Range Status   07/11/2023 12:00 PM 20 0 - 40 mg/dL Final      Last I/O:  I/O last 3 completed shifts:  In: 999 (9.7 mL/kg) [IV Piggyback:999]  Out: - (0 mL/kg)   Weight: 103.3 kg     Past Cardiology Tests (Last 3 Years):  Echocardiogram independently reviewed from ER: Atrial fibrillation, rate 140      Past Medical History:  He has no past medical history on file.    Past Surgical History:  He has no past surgical history on file.      Social History:  He reports that he has never smoked. He has never used smokeless tobacco. No history on file for alcohol use and drug use.    Family History:  Family History[1]     Allergies:  Patient has no known allergies.    Inpatient Medications:  Scheduled Medications[2]  PRN Medications[3]  Continuous Medications[4]  Outpatient Medications:  No current outpatient medications    Physical Exam:  Constitutional:       Appearance: Healthy appearance. Not in distress.   Neck:      Vascular: No JVR. JVD normal.   Pulmonary:      Effort: Pulmonary effort is normal.      Breath sounds: Normal breath sounds. No wheezing. No rhonchi. No rales.   Chest:      Chest wall: Not tender to palpatation.   Cardiovascular:      Normal rate. Irregularly irregular rhythm.      Murmurs: There is no murmur.   Edema:     Peripheral edema absent.   Abdominal:      General: Bowel sounds are normal.      Palpations: Abdomen is soft.      Tenderness: There is no abdominal tenderness.   Musculoskeletal: Normal range of motion. Skin:     General: Skin is warm and dry.   Neurological:      General: No focal deficit present.      Mental Status: Alert and oriented to person, place and time.           Assessment/Plan   Pleasant 50-year-old gentleman with history of epilepsy and diabetes and hearing loss presenting with somatic new  onset atrial fibrillation.  Recommend IV heparin and continued beta-blocker: Plan for SHARON cardioversion in a.m. if does not self convert today.    Peripheral IV 06/28/25 20 G Right Antecubital (Active)   Site Assessment Clean;Dry;Intact 06/29/25 0900   Dressing Type Transparent 06/29/25 0900   Line Status Flushed;Blood return noted;Saline locked 06/29/25 0900   Dressing Status Clean;Dry;Occlusive 06/29/25 0900   Number of days: 1       Peripheral IV 06/28/25 20 G Left;Posterior Hand (Active)   Site Assessment Clean;Dry;Intact 06/29/25 0900   Dressing Type Transparent 06/29/25 0900   Line Status Flushed;Blood return noted;Infusing 06/29/25 0900   Dressing Status Clean;Dry;Occlusive 06/29/25 0900   Number of days: 1       Code Status:  Full Code            Lorenzo Pizarro MD       [1] No family history on file.  [2]   Scheduled medications   Medication Dose Route Frequency    insulin lispro  0-10 Units subcutaneous 4x daily    metoprolol tartrate  25 mg oral BID   [3]   PRN medications   Medication    acetaminophen    dextrose    dextrose    glucagon    glucagon    heparin   [4]   Continuous Medications   Medication Dose Last Rate    heparin  0-4,500 Units/hr 1,700 Units/hr (06/29/25 1327)

## 2025-06-29 NOTE — H&P
History Of Present Illness  Taqueria Delgado is a 50 y.o. male with hearing loss, epilepsy, and DM presenting with chest pain and a dizzy spell. History was provided by sister at the bedside due to pt's limited verbal and hearing abilities. He apparently was mowing the lawn when he suddenly developed a dizzy spell, with chest pain and diaphoresis. He then fell to the ground but was able to get up by himself. Neighbors saw him and observed that he was not OK so they called EMS on his behalf. Sister reports that he was SOB and mentioned having palpitations. No nausea or vomiting. No LOC. Pt was brought to the ED and was noted to be in new onset atrial fibrillation with VR of 140/min. He received IV metoprolol in ED and his HR dropped to the low 100s.      Past Medical History  Epilepsy  Hearing loss  Type II DM    Past Surgical History  None     Social History  [facility] male. Currently lives independently. Single. Denies tobacco, alcohol or recreational drug use    Family History  Positive for cardiomyopathy in maternal relatives and bicuspid aortic valve in father     Allergies  Patient has no allergy information on record.    Medications  No current outpatient medications      Review of Systems   Constitutional:  Positive for diaphoresis.   HENT:  Positive for hearing loss.    Eyes: Negative.    Respiratory:  Positive for shortness of breath.    Cardiovascular:  Positive for chest pain and palpitations.   Gastrointestinal: Negative.    Endocrine:        Pt is diabetic   Genitourinary: Negative.    Musculoskeletal: Negative.    Skin: Negative.    Allergic/Immunologic: Negative.    Neurological: Negative.    Hematological: Negative.    Psychiatric/Behavioral: Negative.          Physical Exam  Constitutional:       General: He is not in acute distress.     Appearance: He is obese. He is diaphoretic. He is not ill-appearing or toxic-appearing.   HENT:      Head: Normocephalic and atraumatic.      Nose: Nose normal.      " Mouth/Throat:      Mouth: Mucous membranes are moist.      Pharynx: Oropharynx is clear. No oropharyngeal exudate or posterior oropharyngeal erythema.   Eyes:      General: No scleral icterus.        Right eye: No discharge.         Left eye: No discharge.      Conjunctiva/sclera: Conjunctivae normal.   Cardiovascular:      Rate and Rhythm: Rhythm irregular.      Heart sounds: Murmur heard.      Comments: S1 and S2 irregularly irregular with soft systolic murmur  Pulmonary:      Breath sounds: No wheezing, rhonchi or rales.   Abdominal:      Palpations: Abdomen is soft. There is no mass.      Tenderness: There is abdominal tenderness. There is no right CVA tenderness, left CVA tenderness or guarding.   Musculoskeletal:      Cervical back: Neck supple.      Right lower leg: No edema.      Left lower leg: No edema.   Lymphadenopathy:      Cervical: No cervical adenopathy.   Skin:     General: Skin is warm.      Findings: No lesion or rash.   Neurological:      General: No focal deficit present.      Mental Status: He is alert and oriented to person, place, and time.   Psychiatric:         Mood and Affect: Mood normal.         Behavior: Behavior normal.          Last Recorded Vitals  Blood pressure 128/85, pulse (!) 103, temperature 36.1 °C (97 °F), temperature source Temporal, resp. rate (!) 22, height 1.778 m (5' 10\"), weight 116 kg (255 lb), SpO2 98%.    Relevant Results    Latest Reference Range & Units 06/28/25 20:11 06/28/25 20:26 06/28/25 21:34 06/28/25 23:52   GLUCOSE 74 - 99 mg/dL 203 (H)      SODIUM 136 - 145 mmol/L 137      POTASSIUM 3.5 - 5.3 mmol/L 3.7      CHLORIDE 98 - 107 mmol/L 101      Bicarbonate 21 - 32 mmol/L 18 (L)      Anion Gap 10 - 20 mmol/L 22 (H)      Blood Urea Nitrogen 6 - 23 mg/dL 16      Creatinine 0.50 - 1.30 mg/dL 1.03      EGFR >60 mL/min/1.73m*2 88      Calcium 8.6 - 10.3 mg/dL 9.9      Albumin 3.4 - 5.0 g/dL 4.7      Alkaline Phosphatase 33 - 120 U/L 72      ALT 10 - 52 U/L 51    "   AST 9 - 39 U/L 31      Bilirubin Total 0.0 - 1.2 mg/dL 0.6      Total Protein 6.4 - 8.2 g/dL 7.1      BNP 0 - 99 pg/mL 86      LIPASE 9 - 82 U/L 52      Troponin I, High Sensitivity 0 - 20 ng/L 17  23 (H) 28 (H)   WBC 4.4 - 11.3 x10*3/uL 9.7      nRBC 0.0 - 0.0 /100 WBCs 0.0      RBC 4.50 - 5.90 x10*6/uL 4.67      HEMOGLOBIN 13.5 - 17.5 g/dL 12.5 (L)      HEMATOCRIT 41.0 - 52.0 % 36.9 (L)      MCV 80 - 100 fL 79 (L)      MCH 26.0 - 34.0 pg 26.8      MCHC 32.0 - 36.0 g/dL 33.9      RED CELL DISTRIBUTION WIDTH 11.5 - 14.5 % 13.5      Platelets 150 - 450 x10*3/uL 268      Neutrophils % 40.0 - 80.0 % 62.4      Immature Granulocytes %, Automated 0.0 - 0.9 % 0.2      Lymphocytes % 13.0 - 44.0 % 26.1      Monocytes % 2.0 - 10.0 % 8.0      Eosinophils % 0.0 - 6.0 % 2.6      Basophils % 0.0 - 2.0 % 0.7      Neutrophils Absolute 1.20 - 7.70 x10*3/uL 6.04      Immature Granulocytes Absolute, Automated 0.00 - 0.70 x10*3/uL 0.02      Lymphocytes Absolute 1.20 - 4.80 x10*3/uL 2.53      Monocytes Absolute 0.10 - 1.00 x10*3/uL 0.78      Eosinophils Absolute 0.00 - 0.70 x10*3/uL 0.25      Basophils Absolute 0.00 - 0.10 x10*3/uL 0.07      POCT pH, Venous 7.33 - 7.43 pH  7.45 (H)     POCT pCO2, Venous 41 - 51 mm Hg  32 (L)     POCT pO2, Venous 35 - 45 mm Hg  44     POCT SO2, Venous 45 - 75 %  81 (H)     POCT Oxy Hemoglobin, Venous 45.0 - 75.0 %  79.1 (H)     POCT Hematocrit Calculated, Venous 41.0 - 52.0 %  37.0 (L)     POCT Sodium, Venous 136 - 145 mmol/L  136     POCT Potassium, Venous 3.5 - 5.3 mmol/L  4.1     POCT Chloride, Venous 98 - 107 mmol/L  103     POCT Ionized Calicum, Venous 1.10 - 1.33 mmol/L  1.19     POCT Glucose, Venous 74 - 99 mg/dL  236 (H)     POCT Lactate, Venous 0.4 - 2.0 mmol/L  2.2 (H)     POCT Base Excess, Venous -2.0 - 3.0 mmol/L  -1.1     POCT HCO3 Calculated, Venous 22.0 - 26.0 mmol/L  22.2     POCT Hemoglobin, Venous 13.5 - 17.5 g/dL  12.4 (L)     POCT Anion Gap, Venous 10.0 - 25.0 mmol/L  15.0     FiO2  %  21     Patient Temperature   COMMENT ONLY     (H): Data is abnormally high  (L): Data is abnormally low  Assessment & Plan  Atrial fibrillation with RVR (Multi)    New onset atrial fibrillation with RVR  - CHADSVASC score is 1  - Currently rate controlled  - Admit to telemetry  - Check TSH and lytes (especially Mg)  - Check ECHO  - Serial cardiac markers  - Begin oral metoprolol  - Continue IV anticoagulation initiated in ED  - Cardiology to see    Type II DM  - Accu checks and will add ISS coverage  - Check HgA1c    Total hearing loss  - Pt communicates mainly by sign language      I spent 75 minutes in the professional and overall care of this patient.      Robbin Tan MD

## 2025-06-29 NOTE — ED PROVIDER NOTES
CC: Chest Pain (BIBA for chest pain that started suddenly while the patient was mowing the lawn. He began sweating, his head hurt, and the chest pain worsened and moved into abdomen. The patient states that he fell to the ground. Unclear if he hit his head. Did not lose consciousness. Per squad, HR was 155 and IVF were administered by EMS. Patient is deaf, sister at bedside. )     HPI:  Patient is a 50-year-old male with a past medical history of epilepsy who presented to the ED for chest pain. Patient is deaf and communicates with ASL. [employer]  was used.  Patient sister also assisted in interpretation.  Patient noted that he had sudden onset headache as well as chest pain that began at approximately 5 PM today.  That he was mowing his lawn at that time.  Patient noted hitting his head.  Unsure of loss of consciousness.  EMS initiated patient on  cc normal saline bolus.  Patient also noting midepigastric abdominal pain.  Patient is unsure of what he takes for his epilepsy.  Patient not understanding the question if he has allergies.  Denied sick symptoms, vomiting, hematemesis, hematochezia, and melena.    Limitations to history: None  Independent historian(s): Patient and sister  Records Reviewed: Recent available ED and inpatient notes reviewed in EMR.    PMHx/PSHx:  Per HPI.   - has no past medical history on file.  - has no past surgical history on file.    Medications:  Reviewed in EMR. See EMR for complete list of medications and doses.    Allergies:  Patient has no allergy information on record.    Social History:  - Tobacco:  has no history on file for tobacco use.   - Alcohol:  has no history on file for alcohol use.   - Illicit Drugs:  has no history on file for drug use.     ROS:  Per HPI.       ???????????????????????????????????????????????????????????????  Triage Vitals:  T 36.1 °C (97 °F)  HR (!) 140  BP (!) 165/126  RR (!) 22  O2 98 %      Physical Exam  Vitals and nursing note  reviewed.   Constitutional:       General: He is not in acute distress.  HENT:      Head: Normocephalic and atraumatic.      Nose: Nose normal.      Mouth/Throat:      Mouth: Mucous membranes are moist.   Eyes:      Conjunctiva/sclera: Conjunctivae normal.   Cardiovascular:      Rate and Rhythm: Tachycardia present. Rhythm irregular.      Pulses: Normal pulses.   Pulmonary:      Effort: Pulmonary effort is normal. No respiratory distress.      Breath sounds: Normal breath sounds.   Abdominal:      Tenderness: There is abdominal tenderness.      Comments: Right upper quadrant TTP.  Positive Randle sign.   Skin:     General: Skin is warm.   Neurological:      General: No focal deficit present.      Mental Status: He is alert and oriented to person, place, and time.      Cranial Nerves: No cranial nerve deficit.         ???????????????????????????????????????????????????????????????  Labs:   Labs Reviewed   B-TYPE NATRIURETIC PEPTIDE   CBC WITH AUTO DIFFERENTIAL   COMPREHENSIVE METABOLIC PANEL   TROPONIN SERIES- (INITIAL, 1 HR)    Narrative:     The following orders were created for panel order Troponin I Series, High Sensitivity (0, 1 HR).  Procedure                               Abnormality         Status                     ---------                               -----------         ------                     Troponin I, High Sensiti...[[id number]]                      In process                 Troponin, High Sensitivi...[[id number]]                                                   Please view results for these tests on the individual orders.   SERIAL TROPONIN-INITIAL   SERIAL TROPONIN, 1 HOUR        Imaging:   No orders to display        MDM:  Patient is a 50-year-old male with a past medical history of epilepsy who presented to the ED for chest pain.  Presented tachycardic, tachypneic, and hypertensive.  Patient noted to be in A-fib with RVR.  High clinical concern for syncope given that patient is unsure of why  he fell.  Given significant headache with syncope, concern for subarachnoid hemorrhage.  CT head ordered.  Given chest pain with syncope, concern for PE.  CT PE ordered.  Bedside POCUS with grossly normal EF, no pericardial effusion appreciated, and no B-lines.  With A-fib with RVR, patient initiated on LR and metoprolol.  Patient ordered morphine for pain.  Given midepigastric pain, lipase and CTAP ordered.  Cardiac workup ordered ordered.  Please see ED course and disposition for remainder of care.    ED Course:  ED Course as of 06/28/25 2050   Sat Jun 28, 2025 2045 Lipase normal at 52.  Venous full panel significant for respiratory alkalosis, mildly elevated lactate 2.2, and hyperglycemia 236.  Initial troponin normal at 17.  BNP normal at 86.  Metabolic panel significant for hyperglycemia and metabolic acidosis.  CBC without leukocytosis and mild anemia with a hemoglobin of 12.5. []   2046 EKG: Rate is 140, A-fib with RVR, normal axis, no interval prolongation, no st elevation or depression.  No previous EKG for comparison.  Review of EKG does not show any signs of STEMI, complete heart block, asystole, V-fib. []      ED Course User Index  [MH] Esteban Amaya MD       Social Determinants Limiting Care:  None identified    Disposition:   Patient care to be continued by Dr. Lynch pending labs and imaging.  Plan for admission.     Esteban Amaya MD   Emergency Medicine PGY-3  [facility]    Comment: Please note this report has been produced using speech recognition software and may contain errors related to that system including errors in grammar, punctuation, and spelling as well as words and phrases that may be inappropriate.  If there are any questions or concerns please feel free to contact the dictating provider for clarification.    Procedures ? Smart[employer] last updated 6/28/2025 8:15 PM            Esteban Amaya MD  Resident  06/28/25 0585

## 2025-06-29 NOTE — PROGRESS NOTES
06/29/25 0919   Discharge Planning   Living Arrangements Alone  (sister lives close. Pt deaf and sister signs)   Support Systems Family members;Friends/neighbors   Assistance Needed Pt deaf-assess done with printed questions and sister bedside to assist by signing: A&OX4; independent with ADLs with no DME; room air baseline and currently room air-denies CPAP; doesn't drive ([employer]); PCP [facility] PCP Emma Paulino CNP; denies current home care; no current DOAC prior to hospitalization   Type of Residence Private residence   Number of Stairs to Enter Residence 3   Number of Stairs Within Residence 1   Do you have animals or pets at home? Yes   Type of Animals or Pets 2 dogs   Who is requesting discharge planning? Provider   Expected Discharge Disposition Home  (Pt denies home going needs. Pending cardiology? New DOAC?)   Does the patient need discharge transport arranged? Yes   RoundTrip coordination needed? Yes   Has discharge transport been arranged? No   Financial Resource Strain   How hard is it for you to pay for the very basics like food, housing, medical care, and heating? Not hard   Housing Stability   In the last 12 months, was there a time when you were not able to pay the mortgage or rent on time? N   In the past 12 months, how many times have you moved where you were living? 0   At any time in the past 12 months, were you homeless or living in a shelter (including now)? N   Transportation Needs   In the past 12 months, has lack of transportation kept you from medical appointments or from getting medications? no   In the past 12 months, has lack of transportation kept you from meetings, work, or from getting things needed for daily living? No

## 2025-06-30 ENCOUNTER — ANESTHESIA EVENT (OUTPATIENT)
Dept: CARDIOLOGY | Facility: HOSPITAL | Age: 50
DRG: 309 | End: 2025-06-30
Payer: MEDICARE

## 2025-06-30 ENCOUNTER — APPOINTMENT (OUTPATIENT)
Dept: CARDIOLOGY | Facility: HOSPITAL | Age: 50
DRG: 309 | End: 2025-06-30
Payer: MEDICARE

## 2025-06-30 ENCOUNTER — PHARMACY VISIT (OUTPATIENT)
Dept: PHARMACY | Facility: CLINIC | Age: 50
End: 2025-06-30
Payer: COMMERCIAL

## 2025-06-30 ENCOUNTER — ANESTHESIA (OUTPATIENT)
Dept: CARDIOLOGY | Facility: HOSPITAL | Age: 50
DRG: 309 | End: 2025-06-30
Payer: MEDICARE

## 2025-06-30 VITALS
RESPIRATION RATE: 17 BRPM | HEIGHT: 70 IN | OXYGEN SATURATION: 95 % | TEMPERATURE: 97.3 F | WEIGHT: 227.8 LBS | SYSTOLIC BLOOD PRESSURE: 125 MMHG | HEART RATE: 70 BPM | BODY MASS INDEX: 32.61 KG/M2 | DIASTOLIC BLOOD PRESSURE: 90 MMHG

## 2025-06-30 DIAGNOSIS — I35.0 NONRHEUMATIC AORTIC (VALVE) STENOSIS: Primary | ICD-10-CM

## 2025-06-30 LAB
ANION GAP SERPL CALC-SCNC: 13 MMOL/L (ref 10–20)
BUN SERPL-MCNC: 16 MG/DL (ref 6–23)
CALCIUM SERPL-MCNC: 9.1 MG/DL (ref 8.6–10.3)
CHLORIDE SERPL-SCNC: 103 MMOL/L (ref 98–107)
CHOLEST SERPL-MCNC: 220 MG/DL (ref 0–199)
CHOLESTEROL/HDL RATIO: 4.2
CO2 SERPL-SCNC: 28 MMOL/L (ref 21–32)
CREAT SERPL-MCNC: 0.92 MG/DL (ref 0.5–1.3)
EGFRCR SERPLBLD CKD-EPI 2021: >90 ML/MIN/1.73M*2
ERYTHROCYTE [DISTWIDTH] IN BLOOD BY AUTOMATED COUNT: 13.9 % (ref 11.5–14.5)
GLUCOSE BLD MANUAL STRIP-MCNC: 173 MG/DL (ref 74–99)
GLUCOSE BLD MANUAL STRIP-MCNC: 187 MG/DL (ref 74–99)
GLUCOSE BLD MANUAL STRIP-MCNC: 191 MG/DL (ref 74–99)
GLUCOSE SERPL-MCNC: 193 MG/DL (ref 74–99)
HCT VFR BLD AUTO: 40.4 % (ref 41–52)
HDLC SERPL-MCNC: 52.2 MG/DL
HGB BLD-MCNC: 13.3 G/DL (ref 13.5–17.5)
HOLD SPECIMEN: NORMAL
LDLC SERPL CALC-MCNC: 139 MG/DL
MAGNESIUM SERPL-MCNC: 1.78 MG/DL (ref 1.6–2.4)
MCH RBC QN AUTO: 26.9 PG (ref 26–34)
MCHC RBC AUTO-ENTMCNC: 32.9 G/DL (ref 32–36)
MCV RBC AUTO: 82 FL (ref 80–100)
NON HDL CHOLESTEROL: 168 MG/DL (ref 0–149)
NRBC BLD-RTO: 0 /100 WBCS (ref 0–0)
PLATELET # BLD AUTO: 247 X10*3/UL (ref 150–450)
POTASSIUM SERPL-SCNC: 4.1 MMOL/L (ref 3.5–5.3)
RBC # BLD AUTO: 4.95 X10*6/UL (ref 4.5–5.9)
SODIUM SERPL-SCNC: 140 MMOL/L (ref 136–145)
TRIGL SERPL-MCNC: 145 MG/DL (ref 0–149)
UFH PPP CHRO-ACNC: 0.5 IU/ML (ref ?–1.1)
VLDL: 29 MG/DL (ref 0–40)
WBC # BLD AUTO: 9.1 X10*3/UL (ref 4.4–11.3)

## 2025-06-30 PROCEDURE — 93321 DOPPLER ECHO F-UP/LMTD STD: CPT

## 2025-06-30 PROCEDURE — 3700000002 HC GENERAL ANESTHESIA TIME - EACH INCREMENTAL 1 MINUTE

## 2025-06-30 PROCEDURE — RXMED WILLOW AMBULATORY MEDICATION CHARGE

## 2025-06-30 PROCEDURE — 83718 ASSAY OF LIPOPROTEIN: CPT | Performed by: NURSE PRACTITIONER

## 2025-06-30 PROCEDURE — 2500000005 HC RX 250 GENERAL PHARMACY W/O HCPCS: Performed by: STUDENT IN AN ORGANIZED HEALTH CARE EDUCATION/TRAINING PROGRAM

## 2025-06-30 PROCEDURE — A93312 PR ECHO HEART,TRANSESOPHAGEAL,COMPLETE: Performed by: ANESTHESIOLOGY

## 2025-06-30 PROCEDURE — 2500000004 HC RX 250 GENERAL PHARMACY W/ HCPCS (ALT 636 FOR OP/ED): Performed by: INTERNAL MEDICINE

## 2025-06-30 PROCEDURE — 93005 ELECTROCARDIOGRAM TRACING: CPT

## 2025-06-30 PROCEDURE — 93321 DOPPLER ECHO F-UP/LMTD STD: CPT | Performed by: STUDENT IN AN ORGANIZED HEALTH CARE EDUCATION/TRAINING PROGRAM

## 2025-06-30 PROCEDURE — 3700000001 HC GENERAL ANESTHESIA TIME - INITIAL BASE CHARGE

## 2025-06-30 PROCEDURE — 85520 HEPARIN ASSAY: CPT | Performed by: INTERNAL MEDICINE

## 2025-06-30 PROCEDURE — 93306 TTE W/DOPPLER COMPLETE: CPT | Performed by: STUDENT IN AN ORGANIZED HEALTH CARE EDUCATION/TRAINING PROGRAM

## 2025-06-30 PROCEDURE — 82947 ASSAY GLUCOSE BLOOD QUANT: CPT

## 2025-06-30 PROCEDURE — 80048 BASIC METABOLIC PNL TOTAL CA: CPT | Performed by: INTERNAL MEDICINE

## 2025-06-30 PROCEDURE — A93312 PR ECHO HEART,TRANSESOPHAGEAL,COMPLETE: Performed by: NURSE ANESTHETIST, CERTIFIED REGISTERED

## 2025-06-30 PROCEDURE — 5A2204Z RESTORATION OF CARDIAC RHYTHM, SINGLE: ICD-10-PCS | Performed by: STUDENT IN AN ORGANIZED HEALTH CARE EDUCATION/TRAINING PROGRAM

## 2025-06-30 PROCEDURE — 2500000004 HC RX 250 GENERAL PHARMACY W/ HCPCS (ALT 636 FOR OP/ED): Performed by: STUDENT IN AN ORGANIZED HEALTH CARE EDUCATION/TRAINING PROGRAM

## 2025-06-30 PROCEDURE — 93312 ECHO TRANSESOPHAGEAL: CPT | Performed by: STUDENT IN AN ORGANIZED HEALTH CARE EDUCATION/TRAINING PROGRAM

## 2025-06-30 PROCEDURE — 36415 COLL VENOUS BLD VENIPUNCTURE: CPT | Performed by: NURSE PRACTITIONER

## 2025-06-30 PROCEDURE — 85027 COMPLETE CBC AUTOMATED: CPT | Performed by: NURSE PRACTITIONER

## 2025-06-30 PROCEDURE — 99238 HOSP IP/OBS DSCHRG MGMT 30/<: CPT | Performed by: INTERNAL MEDICINE

## 2025-06-30 PROCEDURE — C8929 TTE W OR WO FOL WCON,DOPPLER: HCPCS

## 2025-06-30 PROCEDURE — 93312 ECHO TRANSESOPHAGEAL: CPT

## 2025-06-30 PROCEDURE — 2500000001 HC RX 250 WO HCPCS SELF ADMINISTERED DRUGS (ALT 637 FOR MEDICARE OP): Performed by: INTERNAL MEDICINE

## 2025-06-30 PROCEDURE — 93325 DOPPLER ECHO COLOR FLOW MAPG: CPT | Performed by: STUDENT IN AN ORGANIZED HEALTH CARE EDUCATION/TRAINING PROGRAM

## 2025-06-30 PROCEDURE — 92960 CARDIOVERSION ELECTRIC EXT: CPT | Performed by: STUDENT IN AN ORGANIZED HEALTH CARE EDUCATION/TRAINING PROGRAM

## 2025-06-30 PROCEDURE — 83735 ASSAY OF MAGNESIUM: CPT | Performed by: INTERNAL MEDICINE

## 2025-06-30 PROCEDURE — 2500000001 HC RX 250 WO HCPCS SELF ADMINISTERED DRUGS (ALT 637 FOR MEDICARE OP): Performed by: NURSE PRACTITIONER

## 2025-06-30 RX ORDER — LIDOCAINE HYDROCHLORIDE 20 MG/ML
SOLUTION OROPHARYNGEAL AS NEEDED
Status: DISCONTINUED | OUTPATIENT
Start: 2025-06-30 | End: 2025-06-30 | Stop reason: HOSPADM

## 2025-06-30 RX ORDER — METFORMIN HYDROCHLORIDE 500 MG/1
500 TABLET ORAL
Qty: 60 TABLET | Refills: 0 | Status: SHIPPED | OUTPATIENT
Start: 2025-06-30

## 2025-06-30 RX ORDER — METOPROLOL TARTRATE 25 MG/1
25 TABLET, FILM COATED ORAL 2 TIMES DAILY
Qty: 60 TABLET | Refills: 0 | Status: SHIPPED | OUTPATIENT
Start: 2025-06-30

## 2025-06-30 RX ORDER — SODIUM CHLORIDE 9 MG/ML
INJECTION, SOLUTION INTRAVENOUS CONTINUOUS PRN
Status: COMPLETED | OUTPATIENT
Start: 2025-06-30 | End: 2025-06-30

## 2025-06-30 RX ADMIN — HEPARIN SODIUM 1700 UNITS/HR: 10000 INJECTION, SOLUTION INTRAVENOUS at 02:37

## 2025-06-30 RX ADMIN — METOPROLOL TARTRATE 25 MG: 25 TABLET, FILM COATED ORAL at 08:20

## 2025-06-30 RX ADMIN — SODIUM CHLORIDE 50 ML/HR: 9 INJECTION, SOLUTION INTRAVENOUS at 14:27

## 2025-06-30 RX ADMIN — LIDOCAINE HYDROCHLORIDE 1.25 ML: 20 SOLUTION ORAL; TOPICAL at 14:26

## 2025-06-30 RX ADMIN — BENZOCAINE, BUTAMBEN, AND TETRACAINE HYDROCHLORIDE 2 SPRAY: .028; .004; .004 AEROSOL, SPRAY TOPICAL at 14:26

## 2025-06-30 RX ADMIN — SODIUM CHLORIDE 250 ML: 9 INJECTION, SOLUTION INTRAVENOUS at 14:45

## 2025-06-30 RX ADMIN — APIXABAN 5 MG: 5 TABLET, FILM COATED ORAL at 17:13

## 2025-06-30 RX ADMIN — PERFLUTREN 2 ML OF DILUTION: 6.52 INJECTION, SUSPENSION INTRAVENOUS at 16:52

## 2025-06-30 SDOH — HEALTH STABILITY: MENTAL HEALTH: CURRENT SMOKER: 0

## 2025-06-30 ASSESSMENT — COGNITIVE AND FUNCTIONAL STATUS - GENERAL
DAILY ACTIVITIY SCORE: 24
MOBILITY SCORE: 24

## 2025-06-30 ASSESSMENT — PAIN SCALES - GENERAL
PAINLEVEL_OUTOF10: 0 - NO PAIN

## 2025-06-30 ASSESSMENT — PAIN - FUNCTIONAL ASSESSMENT
PAIN_FUNCTIONAL_ASSESSMENT: 0-10

## 2025-06-30 NOTE — PROCEDURES
Procedure narrative:  The risks, benefits, and alternatives to the procedure and sedation were explained to the patient, and informed consent was obtained. The patient was in the fasting state. A grounding pad was placed. Self-adhesive anterior-posterior defibrillation pads were applied. A defibrillator was used for monitoring and the defibrillator waveform was set to biphasic. The patient was set up for continuous monitoring of surface 12 lead ECG, capnography, and pulse oximetry. Blood pressure was monitored with automatic cuff measurements. The procedure was performed under IV conscious sedation supplemented with intermittent deep sedation delivered by anaesthesia.    Transesophageal echo (SHARON) probe was inserted. There was no left atrial appendage thrombus found. SHARON probe was withdrawn successfully.    When pt had been adequately sedated, they were cardioverted with a 200J biphasic shock.  This restored sinus rhythm which was confirmed by tele and 12-lead ECG.       Summary:  Patient was successfully cardioverted from Atrial fibrillation to Sinus rhythm    Complications:  Patient tolerated the procedure well with no complications noted.      Patient Instructions:  - No Driving or making legal decisions for 24 hours  - DO NOT Stop your blood thinner unless emergency without checking in with your doctor     Follow up:   DO NOT Stop your blood thinner unless emergency without checking in with your doctor.  No driving, alcohol or making legal decisions for 24 hours.  Plan for follow up with patient's cardiologist/electrophysiologist as scheduled

## 2025-06-30 NOTE — SIGNIFICANT EVENT
Attempt made to use [employer]  service via tablet from radiology. After 25 minutes and four people trying, we were unable to access [employer]  service. IT was contacted by radiology personnel. This RN utilized family in the room, patient's ability to lip read, and pen and paper to communicate with patient.

## 2025-06-30 NOTE — CARE PLAN
The patient's goals for the shift include  get procedure and go home    The clinical goals for the shift include pt will be free of chest pain during shift      Problem: Pain - Adult  Goal: Verbalizes/displays adequate comfort level or baseline comfort level  Outcome: Progressing     Problem: Safety - Adult  Goal: Free from fall injury  Outcome: Progressing     Problem: Discharge Planning  Goal: Discharge to home or other facility with appropriate resources  Outcome: Progressing

## 2025-06-30 NOTE — PROGRESS NOTES
"Subjective Data:  Denies chest pain, remains on room air.     Overnight Events:    Remains in atrial fibrillation HR 90s     Objective Data:  Last Recorded Vitals:  Vitals:    06/29/25 2340 06/30/25 0415 06/30/25 0608 06/30/25 0739   BP: 131/90 (!) 128/91  (!) 126/92   BP Location: Left arm Left arm  Left arm   Pulse: 75 84  93   Resp: 18 18  18   Temp: 36.2 °C (97.2 °F) 36.7 °C (98.1 °F)  36.1 °C (97 °F)   TempSrc: Temporal Temporal  Temporal   SpO2: 95% 97%  96%   Weight:   103 kg (227 lb 12.8 oz)    Height:           Last Labs:  CBC - 6/30/2025:  6:20 AM  9.1 13.3 247    40.4      CMP - 6/30/2025:  6:20 AM  9.1 7.1 31 --- 0.6   _ 4.7 51 72      PTT - No results in last year.  1.1   12.1 _     TROPHS   Date/Time Value Ref Range Status   06/29/2025 06:19 AM 30 0 - 20 ng/L Final   06/28/2025 11:52 PM 28 0 - 20 ng/L Final   06/28/2025 09:34 PM 23 0 - 20 ng/L Final     BNP   Date/Time Value Ref Range Status   06/28/2025 08:11 PM 86 0 - 99 pg/mL Final   08/05/2024 01:50 PM 19 0 - 99 pg/mL Final     HGBA1C   Date/Time Value Ref Range Status   06/29/2025 06:19 AM 8.3 See comment % Final   08/05/2024 01:50 PM 11.8 see below % Final     VLDL   Date/Time Value Ref Range Status   07/11/2023 12:00 PM 20 0 - 40 mg/dL Final      Last I/O:  I/O last 3 completed shifts:  In: 1942.4 (18.8 mL/kg) [P.O.:480; I.V.:463.4 (4.5 mL/kg); IV Piggyback:999]  Out: 1875 (18.1 mL/kg) [Urine:1875 (0.5 mL/kg/hr)]  Weight: 103.3 kg     Past Cardiology Tests (Last 3 Years):  EKG:  No results found for this or any previous visit from the past 1095 days.    Echo:  No results found for this or any previous visit from the past 1095 days.    Ejection Fractions:  No results found for: \"EF\"  Cath:  No results found for this or any previous visit from the past 1095 days.    Stress Test:  No results found for this or any previous visit from the past 1095 days.    Cardiac Imaging:  No results found for this or any previous visit from the past 1095 " days.      Inpatient Medications:  Scheduled Medications[1]  PRN Medications[2]  Continuous Medications[3]      Physical Exam  Vitals reviewed.   Constitutional:       Appearance: Normal appearance.   HENT:      Head: Normocephalic and atraumatic.   Neck:      Vascular: No carotid bruit or JVD.   Cardiovascular:      Rate and Rhythm: Normal rate. Rhythm irregularly irregular.      Pulses: Normal pulses.      Heart sounds: Murmur heard.      Systolic murmur is present with a grade of 2/6.   Pulmonary:      Effort: Pulmonary effort is normal.      Breath sounds: Normal breath sounds.   Abdominal:      General: Abdomen is flat. Bowel sounds are normal.      Palpations: Abdomen is soft.   Skin:     General: Skin is warm and dry.   Neurological:      General: No focal deficit present.      Mental Status: He is alert and oriented to person, place, and time. Mental status is at baseline.   Psychiatric:         Mood and Affect: Mood normal.         Behavior: Behavior normal.       Assessment/Plan   Assessment  50 y.o. male with PMH of epilepsy, DM presenting with chest pain and dizziness. Found to be in new onset atrial fibrillation with RVR  bpm.     New onset atrial fibrillation  Hypokalemia - improved   Hypomagnesemia - improved    Plan  -TTE for structural and functional assessment  -NPO for SHARON/DCCV today, patient agreeable to proceed  -Continue heparin gtt for now, plan to transition to Eliquis 5 mg BID   -Continue metoprolol tartrate 25 mg BID    Peripheral IV 06/28/25 20 G Right Antecubital (Active)   Site Assessment Clean;Dry;Intact 06/30/25 0832   Dressing Type Transparent 06/30/25 0832   Line Status Flushed;Saline locked 06/30/25 0832   Dressing Status Clean;Dry 06/30/25 0832   Number of days: 2       Peripheral IV 06/28/25 20 G Left;Posterior Hand (Active)   Site Assessment Clean;Dry;Intact 06/30/25 0832   Dressing Type Transparent 06/30/25 0832   Line Status Infusing 06/30/25 0832   Dressing Status  Clean;Dry 06/30/25 0832   Number of days: 2       Code Status:  Full Code        KEZIA Weber         [1]   Scheduled medications   Medication Dose Route Frequency    insulin lispro  0-10 Units subcutaneous 4x daily    metoprolol tartrate  25 mg oral BID   [2]   PRN medications   Medication    acetaminophen    dextrose    dextrose    glucagon    glucagon    heparin   [3]   Continuous Medications   Medication Dose Last Rate    heparin  0-4,500 Units/hr 1,700 Units/hr (06/30/25 0732)

## 2025-06-30 NOTE — ANESTHESIA POSTPROCEDURE EVALUATION
Patient: Taqueria Delgado    Procedure Summary       Date: 06/30/25 Room / Location: [facility]; [facility]    Anesthesia Start: 1407 Anesthesia Stop: 1447    Procedure: TRANSESOPHAGEAL ECHO (SHARON) W/ POSSIBLE CARDIOVERSION Diagnosis:       Atrial fibrillation with rapid ventricular response (Multi)      AF (paroxysmal atrial fibrillation) (Multi)      (atrial fibrillation)    Scheduled Providers: Agustin Alston MD Responsible Provider: Domenico Covington MD    Anesthesia Type: MAC ASA Status: 3            Anesthesia Type: MAC    Vitals Value Taken Time   /82 06/30/25 15:15   Temp  06/30/25 15:18   Pulse 66 06/30/25 15:15   Resp 17 06/30/25 15:15   SpO2 95 % 06/30/25 15:15       Anesthesia Post Evaluation    Patient location during evaluation: PACU  Patient participation: complete - patient participated  Level of consciousness: awake  Pain management: adequate  Multimodal analgesia pain management approach  Airway patency: patent  Two or more strategies used to mitigate risk of obstructive sleep apnea  Cardiovascular status: acceptable  Respiratory status: acceptable  Hydration status: acceptable  Postoperative Nausea and Vomiting: none        No notable events documented.

## 2025-06-30 NOTE — DISCHARGE INSTRUCTIONS
NOTHING TO EAT OR DRINK UNTIL 16:20 (2 HOURS AFTER THROAT NUMBING MEDICATION WAS GIVEN).   Start with small sips of water or ice chips until you are confident the numbness has worn off and you will not choke.     FOR SEDATION RECOVERY:  Do not drive or operate any machinery for 24hrs..  Do not drink any alcohol for 24hrs.   Do not make any important decisions for 24hrs.

## 2025-06-30 NOTE — H&P
History Of Present Illness:    Taqueria Delgado is a 50 y.o. male presenting for aleksandra/dcv     Last Recorded Vitals:  Vitals:    06/30/25 0608 06/30/25 0739 06/30/25 1222 06/30/25 1409   BP:  (!) 126/92 (!) 136/93 (!) 141/107   BP Location:  Left arm Left arm    Patient Position:   Lying    Pulse:  93 84 85   Resp:  18 16 13   Temp:  36.1 °C (97 °F) 36.1 °C (97 °F)    TempSrc:  Temporal Temporal    SpO2:  96% 95% 99%   Weight: 103 kg (227 lb 12.8 oz)      Height:           Last Labs:  CBC - 6/30/2025:  6:20 AM  9.1 13.3 247    40.4      CMP - 6/30/2025:  6:20 AM  9.1 7.1 31 --- 0.6   _ 4.7 51 72      PTT - No results in last year.  1.1   12.1 _     Troponin I, High Sensitivity   Date/Time Value Ref Range Status   06/29/2025 06:19 AM 30 (H) 0 - 20 ng/L Final   06/28/2025 11:52 PM 28 (H) 0 - 20 ng/L Final   06/28/2025 09:34 PM 23 (H) 0 - 20 ng/L Final     BNP   Date/Time Value Ref Range Status   06/28/2025 08:11 PM 86 0 - 99 pg/mL Final   08/05/2024 01:50 PM 19 0 - 99 pg/mL Final     Hemoglobin A1C   Date/Time Value Ref Range Status   06/29/2025 06:19 AM 8.3 (H) See comment % Final   08/05/2024 01:50 PM 11.8 (H) see below % Final     LDL Calculated   Date/Time Value Ref Range Status   06/30/2025 06:20  (H) <=99 mg/dL Final     Comment:                                 Near   Borderline      AGE      Desirable  Optimal    High     High     Very High     0-19 Y     0 - 109     ---    110-129   >/= 130     ----    20-24 Y     0 - 119     ---    120-159   >/= 160     ----      >24 Y     0 -  99   100-129  130-159   160-189     >/=190    LDL Cholesterol is calculated using the Friedewald equation.     VLDL   Date/Time Value Ref Range Status   06/30/2025 06:20 AM 29 0 - 40 mg/dL Final   07/11/2023 12:00 PM 20 0 - 40 mg/dL Final      Last I/O:  I/O last 3 completed shifts:  In: 1942.4 (18.8 mL/kg) [P.O.:480; I.V.:463.4 (4.5 mL/kg); IV Piggyback:999]  Out: 1875 (18.1 mL/kg) [Urine:1875 (0.5 mL/kg/hr)]  Weight: 103.3 kg  "    Past Cardiology Tests (Last 3 Years):  EKG:  No results found for this or any previous visit from the past 1095 days.    Echo:  No results found for this or any previous visit from the past 1095 days.    Ejection Fractions:  No results found for: \"EF\"  Cath:  No results found for this or any previous visit from the past 1095 days.    Stress Test:  No results found for this or any previous visit from the past 1095 days.    Cardiac Imaging:  No results found for this or any previous visit from the past 1095 days.      Past Medical History:  He has no past medical history on file.    Past Surgical History:  He has no past surgical history on file.      Social History:  He reports that he has never smoked. He has never used smokeless tobacco. No history on file for alcohol use and drug use.    Family History:  Family History[1]     Allergies:  Patient has no known allergies.    Inpatient Medications:  Scheduled Medications[2]  PRN Medications[3]  Continuous Medications[4]  Outpatient Medications:  No current outpatient medications    Physical Exam:  irr     Assessment/Plan   Proceed with aleksandra and possible DCV  Peripheral IV 06/28/25 20 G Right Antecubital (Active)   Site Assessment Clean;Dry;Intact 06/30/25 0832   Dressing Type Transparent 06/30/25 0832   Line Status Flushed;Saline locked 06/30/25 0832   Dressing Status Clean;Dry 06/30/25 0832   Number of days: 2       Peripheral IV 06/28/25 20 G Left;Posterior Hand (Active)   Site Assessment Clean;Dry;Intact 06/30/25 0832   Dressing Type Transparent 06/30/25 0832   Line Status Infusing 06/30/25 0832   Dressing Status Clean;Dry 06/30/25 0832   Number of days: 2       Code Status:  Full Code          Agustin Alston MD       [1] No family history on file.  [2]   Scheduled medications   Medication Dose Route Frequency    insulin lispro  0-10 Units subcutaneous 4x daily    metoprolol tartrate  25 mg oral BID   [3]   PRN medications   Medication    acetaminophen    " dextrose    dextrose    glucagon    glucagon    heparin   [4]   Continuous Medications   Medication Dose Last Rate    heparin  0-4,500 Units/hr 1,700 Units/hr (06/30/25 0732)

## 2025-06-30 NOTE — CARE PLAN
Problem: Pain - Adult  Goal: Verbalizes/displays adequate comfort level or baseline comfort level  6/30/2025 0650 by Phyllis Rosario RN  Outcome: Progressing  6/30/2025 0649 by Phyllis Rosario RN  Outcome: Progressing     Problem: Chronic Conditions and Co-morbidities  Goal: Patient's chronic conditions and co-morbidity symptoms are monitored and maintained or improved  6/30/2025 0650 by Phyllis Rosario RN  Outcome: Progressing  6/30/2025 0649 by Phyllis Rosario RN  Outcome: Progressing   The patient's goals for the shift include      The clinical goals for the shift include pt's heart rate will be controlled    Pt had uneventful night. VSS. Free from pain

## 2025-06-30 NOTE — PROGRESS NOTES
06/30/25 1441   Discharge Planning   Living Arrangements Alone  ((sister lives close. Pt deaf and sister signs))   Support Systems Family members;Friends/neighbors   Assistance Needed Alert and oriented x 4, Pt deaf(assessment  done with printed questions and sister at bedside to assist by signing) Independent with ADL's, No DME, Room air baseline and currently room air, No CPAP/Bipap, Doesn't drive ([place]); PCP [facility]CP Emma Paulino CNP; Denies current home care.   Type of Residence Private residence   Number of Stairs to Enter Residence 3   Number of Stairs Within Residence 1   Do you have animals or pets at home? Yes   Type of Animals or Pets 2 dogs   Who is requesting discharge planning? Provider   Home or Post Acute Services None   Expected Discharge Disposition Home   Does the patient need discharge transport arranged? No   RoundTrip coordination needed? No   Patient Choice   Provider Choice list and CMS website (https://medicare.gov/care-compare#search) for post-acute Quality and Resource Measure Data were provided and reviewed with: Patient;Family   Patient / Family choosing to utilize agency / facility established prior to hospitalization No   Stroke Family Assessment   Stroke Family Assessment Needed No   Intensity of Service   Intensity of Service 0-30 min

## 2025-06-30 NOTE — DISCHARGE SUMMARY
Discharge Diagnosis  Atrial fibrillation with RVR (Multi)   Diabetes, type 2  Isues Requiring Follow-Up  Blood sugar control      Discharge Meds     Medication List      START taking these medications     apixaban 5 mg tablet; Commonly known as: Eliquis; Take 1 tablet (5 mg)   by mouth every 12 hours.   metFORMIN 500 mg tablet; Commonly known as: Glucophage; Take 1 tablet   (500 mg) by mouth 2 times daily (morning and late afternoon).   metoprolol tartrate 25 mg tablet; Commonly known as: Lopressor; Take 1   tablet (25 mg) by mouth 2 times a day.       Test Results Pending At Discharge  Pending Labs       No current pending labs.            Hospital Course   Mr. Delgado is a very pleasant male, deaf, admitted after presenting with dizziness accompanied by chest pain. He was found to have atrial fib, rvr, and was admitted with consult placed to cardiology. He was started on heparin and metop. Today , he underwent a successful cardioversion. He is having an Echo prior to leaving, there is concern for aortic stenosis. He also has diabetes, with a glycohgb of 8.3, and this is lower than previous. I have started him on metformin for this. Cards is recommending eliquis and metop at [place], scripts sent down to our pharmacy. He should follow an ADA diet at discharge    Pertinent Physical Exam At Time of Discharge  See notes    Outpatient Follow-Up  Future Appointments   Date Time Provider Department Center   7/22/2025 11:40 AM Agustin Alston MD [facility]     PCP--3-5 days      Alyssa Parsons MD

## 2025-06-30 NOTE — PROGRESS NOTES
Patient: Taqueria Delgado  Room/bed: 231/231-A  Admitted on: 6/28/2025    Age: 50 y.o.   Gender: male  Code Status:  Full Code   Admitting Dx: AF (paroxysmal atrial fibrillation) (Multi) [I48.0]  Atrial fibrillation with rapid ventricular response (Multi) [I48.91]  Heat exhaustion, initial encounter [T67.5XXA]    MRN: [id number]  PCP: Kamaljit Ortega MD       Subjective   No issues overnight    Objective    Physical Exam  Constitutional:       Appearance: Normal appearance.   HENT:      Head: Normocephalic.      Ears:      Comments: deaf     Nose: Nose normal.      Mouth/Throat:      Mouth: Mucous membranes are dry.   Eyes:      Extraocular Movements: Extraocular movements intact.      Pupils: Pupils are equal, round, and reactive to light.   Cardiovascular:      Rate and Rhythm: Tachycardia present. Rhythm irregular.      Comments: 2/6 hooting systolic murmur, loudest in axilla  Pulmonary:      Effort: Pulmonary effort is normal.      Breath sounds: Normal breath sounds.   Abdominal:      General: Bowel sounds are normal.   Musculoskeletal:      Comments: No edema  Pulses equal   Skin:     General: Skin is warm and dry.   Neurological:      Mental Status: He is alert. Mental status is at baseline.   Psychiatric:         Mood and Affect: Mood normal.          Temp:  [36 °C (96.8 °F)-36.7 °C (98.1 °F)] 36.1 °C (97 °F)  Heart Rate:  [] 93  Resp:  [17-18] 18  BP: (108-142)/(70-92) 126/92    Vitals:    06/30/25 0608   Weight: 103 kg (227 lb 12.8 oz)           I/Os    Intake/Output Summary (Last 24 hours) at 6/30/2025 0936  Last data filed at 6/30/2025 0608  Gross per 24 hour   Intake 943.43 ml   Output 1250 ml   Net -306.57 ml       Labs:   Results from last 72 hours   Lab Units 06/30/25  0620 06/29/25  0619 06/28/25 2011   SODIUM mmol/L 140 139 137   POTASSIUM mmol/L 4.1 3.3* 3.7   CHLORIDE mmol/L 103 104 101   CO2 mmol/L 28 25 18*   BUN mg/dL 16 15 16   CREATININE mg/dL 0.92 0.89 1.03   GLUCOSE mg/dL  "193* 131* 203*   CALCIUM mg/dL 9.1 8.8 9.9   ANION GAP mmol/L 13 13 22*   EGFR mL/min/1.73m*2 >90 >90 88      Results from last 72 hours   Lab Units 06/30/25  0620 06/29/25  0619 06/28/25 2011   WBC AUTO x10*3/uL 9.1 9.3 9.7   HEMOGLOBIN g/dL 13.3* 12.0* 12.5*   HEMATOCRIT % 40.4* 35.1* 36.9*   PLATELETS AUTO x10*3/uL 247 230 268   NEUTROS PCT AUTO %  --   --  62.4   LYMPHS PCT AUTO %  --   --  26.1   MONOS PCT AUTO %  --   --  8.0   EOS PCT AUTO %  --   --  2.6      Lab Results   Component Value Date    CALCIUM 9.1 06/30/2025      Lab Results   Component Value Date    CRP 0.25 07/11/2023      [unfilled]     Micro/ID:                    No lab exists for component: \"AGALPCRNB\"   .ID  No results found for: \"URINECULTURE\", \"BLOODCULT\", \"CSFCULTSMEAR\"    Images:  CT angio chest for pulmonary embolism, CT abdomen pelvis w IV contrast  Narrative: Interpreted By:  Nancy Julien,   STUDY:  CT ANGIO CHEST FOR PULMONARY EMBOLISM; CT ABDOMEN PELVIS W IV  CONTRAST;  6/28/2025 9:16 pm      INDICATION:  Signs/Symptoms:Evaluate for PE; Signs/Symptoms:Right upper quadrant  TTP.      COMPARISON:  CT scan of the abdomen pelvis 01/19/2015.      ACCESSION NUMBER(S):  [id number]; [id number]      ORDERING CLINICIAN:  MAC WILSON      TECHNIQUE:  Helical data acquisition of the chest, abdomen and pelvis was  obtained after intravenous administration of  75 mL of Omnipaque 350.  Images were reformatted in axial, coronal, and sagittal planes.  Axial and coronals MIPS of the chest were reconstructed and reviewed.      FINDINGS:  HEART AND VESSELS:  No acute pulmonary embolism to the  segmental arterial level.      Main pulmonary artery and its branches are normal in caliber.      The thoracic aorta is of normal course and caliber  with scattered  vascular calcifications.      Mild coronary artery calcifications are seen.The study is not  optimized for evaluation of coronary arteries.      The cardiac chambers are borderline " enlarged. Dense aortic root  calcifications noted.      No evidence of pericardial effusion.      MEDIASTINUM AND TOBI, LOWER NECK AND AXILLA:  The visualized thyroid gland is within normal limits.      No evidence of thoracic lymphadenopathy by CT criteria.      Small hiatal hernia.      LUNGS AND AIRWAYS:  The trachea and central airways are patent. No endobronchial lesion.      Respiratory motion artifact limits evaluation of the lung bases. No  consolidation, effusion or pneumothorax. There are several nodules in  the right lower lobe measuring up to 4 mm as seen on axial image 167  of series 401. Findings are stable from CT scan of 2015      CHEST WALL AND OSSEOUS STRUCTURES:  Multilevel degenerative changes are present.          ABDOMEN:      LIVER: Normal morphology. Diffuse hepatic steatosis.  BILE DUCTS: Normal caliber.  GALLBLADDER: No calcified gallstones. No wall thickening.  PANCREAS: Within normal limits.  SPLEEN: Within normal limits.  ADRENALS: Within normal limits.  KIDNEYS and URETERS: Symmetric enhancement without evidence for  calculi, hydronephrosis, hydroureter or perinephric inflammatory  changes. Subcentimeter hypodense foci are too small to characterize.          VESSELS: Calcific atherosclerosis of the aortoiliac vessels. No  aortic aneurysm. Incidental note is made of circumaortic left renal  vein. RETROPERITONEUM: No pathologically enlarged retroperitoneal  lymph nodes.      PELVIS:      REPRODUCTIVE ORGANS: Central prostatic calcifications noted.  BLADDER: Within normal limits.      BOWEL: Small hiatal hernia. Visualized loops of bowel are without  evidence for obstruction. Mural stratification of the colon likely  sequela of remote colitis. No pneumatosis or portal venous gas.  Normal appendix. PERITONEUM: No ascites or free air, no fluid  collection.      ABDOMINAL WALL: Mild diastasis of the rectus sheath with protrusion  of fat. BONES: Multilevel degenerative changes of the spine.       Impression: 1. No acute pulmonary embolism to the segmental arterial level.  2. Dense aortic root calcifications noted. Correlate with  symptomatology and if warranted dedicated ultrasound to exclude  aortic stenosis.  3. There are several nodules in the right lower lobe measuring up to  4 mm, stable from CT scan of 2015.  4. Diffuse hepatic steatosis.  5. Additional findings as noted above.          MACRO:  None      Signed by: Nancy Julien 6/28/2025 10:28 PM  Dictation workstation:   [id number]  CT cervical spine wo IV contrast  Narrative: Interpreted By:  Nancy Julien,   STUDY:  CT CERVICAL SPINE WO IV CONTRAST;  6/28/2025 9:15 pm      INDICATION:  Signs/Symptoms:Fall with head strike.      COMPARISON:  None.      ACCESSION NUMBER(S):  [id number]      ORDERING CLINICIAN:  MAC WILSON      TECHNIQUE:  Axial noncontrast images of the cervical spine with coronal and  sagittal reconstructed images.      FINDINGS:  ALIGNMENT: Normal.  VERTEBRAE: No acute fracture. Mild anterior osteophyte formation at  C4-5 and C6-7. Facet and uncovertebral hypertrophic changes cause  mild bilateral neural foraminal narrowing at C5-6 and C6-7. SPINAL  CANAL: No critical spinal canal stenosis. Disc spur complex at C4-5,  C5-6 and C6-7 causes mild canal narrowing. PREVERTEBRAL SOFT TISSUES:  No prevertebral soft tissue swelling. LUNG APICES: Imaged portion of  the lung apices are within normal limits.      OTHER FINDINGS: None.      Impression: No acute fracture or traumatic subluxation of the cervical spine.      Multilevel discogenic degenerative changes as noted above.      MACRO:  None      Signed by: Nancy Julien 6/28/2025 10:17 PM  Dictation workstation:   [id number]  CT head wo IV contrast  Narrative: Interpreted By:  Nancy Julien,   STUDY:  CT HEAD WO IV CONTRAST;  6/28/2025 9:15 pm      INDICATION:  Signs/Symptoms:Headache with syncope.      COMPARISON:  CT scan of the head 12/26/2011.      ACCESSION  NUMBER(S):  [id number]      ORDERING CLINICIAN:  MAC WILSON      TECHNIQUE:  Axial noncontrast CT images of the head.      FINDINGS:  BRAIN PARENCHYMA: There is area of encephalomalacia in the right  parietal lobe with dystrophic calcifications. Gray-white matter  interfaces are otherwise preserved. No mass, mass effect or midline  shift.      HEMORRHAGE: No acute intracranial hemorrhage.  VENTRICLES and EXTRA-AXIAL SPACES: Normal size. Note is made of cavum  septum pellucidum et vergae. EXTRACRANIAL SOFT TISSUES: Within normal  limits. PARANASAL SINUSES/MASTOIDS: Partial opacification of the left  maxillary sinus and multiple ethmoid air cells. Remainder of the  visualized paranasal sinuses and mastoid air cells are aerated.  CALVARIUM: No depressed skull fracture. No destructive osseous lesion.      OTHER FINDINGS: Atherosclerotic calcification of the carotid siphons.      Impression: No acute intracranial abnormality.      Chronic changes as above.      MACRO:  None      Signed by: Nancy Julien 6/28/2025 10:11 PM  Dictation workstation:   [id number]       Meds    Scheduled medications  Scheduled Medications[1]  Continuous medications  Continuous Medications[2]  PRN medications  PRN Medications[3]     Assessment and Plan    Taqueria Delgado is a 50 y.o. male presented with chest discomfort, dizziness. Found to be in atrial fib, rvr. Plan is for cardioversion  AF, new. Cardioversion today. Dispo pending on how this does. Appreciate cards input.   Seizure disorder, hx  DM. Sugars are fair. Glycohgb is 8.3. should be on medication for this. Consider metformin  Deafness  Hypomag. Resolved.    Alyssa Parsons MD         [1] insulin lispro, 0-10 Units, subcutaneous, 4x daily  metoprolol tartrate, 25 mg, oral, BID  [2] heparin, 0-4,500 Units/hr, Last Rate: 1,700 Units/hr (06/30/25 7694)  [3] PRN medications: acetaminophen, dextrose, dextrose, glucagon, glucagon, heparin

## 2025-06-30 NOTE — NURSING NOTE
Discharge instructions reviewed with son who is going to review with patient. No questions at this time. Education given for new homegoing medications with type, uses, and most common side effects. Patient verbalized understanding. Handout on Afib & Aortic Stenosis given. Telemetry removed and left at nurses station, masimo removed and left at bedside. Discharged home in stable condition.

## 2025-06-30 NOTE — ANESTHESIA PREPROCEDURE EVALUATION
Patient: Taqueria Delgado    Procedure Information       Date/Time: 06/30/25 1100    Scheduled providers: Agustin Alston MD    Procedure: TRANSESOPHAGEAL ECHO (SHARON) W/ POSSIBLE CARDIOVERSION    Location: [facility]; [facility]          Vitals:    06/30/25 1222   BP: (!) 136/93   Pulse: 84   Resp: 16   Temp: 36.1 °C (97 °F)   SpO2: 95%       Surgical History[1]  Medical History[2]  Current Medications[3]  Prior to Admission medications    Not on File     RX Allergies[4]  Social History     Tobacco Use    Smoking status: Never    Smokeless tobacco: Never   Substance Use Topics    Alcohol use: Not on file         Chemistry    Lab Results   Component Value Date/Time     06/30/2025 0620    K 4.1 06/30/2025 0620     06/30/2025 0620    CO2 28 06/30/2025 0620    BUN 16 06/30/2025 0620    CREATININE 0.92 06/30/2025 0620    Lab Results   Component Value Date/Time    CALCIUM 9.1 06/30/2025 0620    ALKPHOS 72 06/28/2025 2011    AST 31 06/28/2025 2011    ALT 51 06/28/2025 2011    BILITOT 0.6 06/28/2025 2011          Lab Results   Component Value Date/Time    WBC 9.1 06/30/2025 0620    HGB 13.3 (L) 06/30/2025 0620    HCT 40.4 (L) 06/30/2025 0620     06/30/2025 0620     Lab Results   Component Value Date/Time    PROTIME 12.1 06/29/2025 0038    INR 1.1 06/29/2025 0038     No results found for this or any previous visit (from the past 4464 hours).  No results found for this or any previous visit from the past 1095 days.    Relevant Problems   Cardiac   (+) Atrial fibrillation with RVR (Multi)   (+) Atrial fibrillation with rapid ventricular response (Multi)       Clinical information reviewed:    Allergies   Problems  Med Hx  Surg Hx   Fam Hx  Soc Hx        NPO Detail:  No data recorded     Physical Exam    Airway  Mallampati: III  TM distance: >3 FB  Mouth opening: 3 or more finger widths     Cardiovascular - normal exam  Rhythm: irregular  Rate: normal     Dental - normal  exam     Pulmonary Breath sounds clear to auscultation     Abdominal (+) obese  Abdomen: soft             Anesthesia Plan    History of general anesthesia?: yes  History of complications of general anesthesia?: no    ASA 3     MAC     The patient is not a current smoker.  Patient was not previously instructed to abstain from smoking on day of procedure.  Patient did not smoke on day of procedure.  Education provided regarding risk of obstructive sleep apnea.  intravenous induction   Anesthetic plan and risks discussed with patient.    Plan discussed with CRNA.           [1] History reviewed. No pertinent surgical history.  [2] History reviewed. No pertinent past medical history.  [3]   Current Facility-Administered Medications:     acetaminophen (Tylenol) tablet 650 mg, 650 mg, oral, q6h PRN, Robbin Tan MD, 650 mg at 06/29/25 0159    dextrose 50 % injection 12.5 g, 12.5 g, intravenous, q15 min PRN, Robbin Tan MD    dextrose 50 % injection 25 g, 25 g, intravenous, q15 min PRN, Robbin Tan MD    glucagon (Glucagen) injection 1 mg, 1 mg, intramuscular, q15 min PRN, Robbin Tan MD    glucagon (Glucagen) injection 1 mg, 1 mg, intramuscular, q15 min PRN, Robbin Tan MD    heparin 25,000 Units in dextrose 5% 250 mL (100 Units/mL) infusion (premix), 0-4,500 Units/hr, intravenous, Continuous, Robbin Tan MD, Last Rate: 17 mL/hr at 06/30/25 0732, 1,700 Units/hr at 06/30/25 0732    heparin bolus from bag 3,000-6,000 Units, 3,000-6,000 Units, intravenous, q4h PRN, Robbin Tan MD    insulin lispro injection 0-10 Units, 0-10 Units, subcutaneous, 4x daily, Robbin Tan MD, 2 Units at 06/29/25 2027    metoprolol tartrate (Lopressor) tablet 25 mg, 25 mg, oral, BID, Robbin Tan MD, 25 mg at 06/30/25 0820  [4] No Known Allergies

## 2025-07-01 LAB
AORTIC VALVE MEAN GRADIENT: 26 MMHG
AORTIC VALVE MEAN GRADIENT: 31 MMHG
AORTIC VALVE PEAK VELOCITY: 3.25 M/S
AORTIC VALVE PEAK VELOCITY: 3.86 M/S
AV PEAK GRADIENT: 42 MMHG
AV PEAK GRADIENT: 60 MMHG
AVA (PEAK VEL): 0.6 CM2
AVA (VTI): 0.72 CM2
EJECTION FRACTION APICAL 4 CHAMBER: 46
EJECTION FRACTION: 53 %
EJECTION FRACTION: 53 %
LEFT VENTRICLE INTERNAL DIMENSION DIASTOLE: 4.28 CM (ref 3.5–6)
LEFT VENTRICULAR OUTFLOW TRACT DIAMETER: 1.82 CM
MITRAL VALVE E/A RATIO: 1.11
RIGHT VENTRICLE FREE WALL PEAK S': 10 CM/S
RIGHT VENTRICLE PEAK SYSTOLIC PRESSURE: 33 MMHG
TRICUSPID ANNULAR PLANE SYSTOLIC EXCURSION: 1.1 CM

## 2025-07-02 ENCOUNTER — TELEPHONE (OUTPATIENT)
Dept: CARDIOLOGY | Facility: HOSPITAL | Age: 50
End: 2025-07-02
Payer: MEDICARE

## 2025-07-02 LAB
ATRIAL RATE: 66 BPM
P AXIS: 48 DEGREES
P OFFSET: 189 MS
P ONSET: 139 MS
PR INTERVAL: 150 MS
Q ONSET: 214 MS
Q ONSET: 216 MS
QRS COUNT: 11 BEATS
QRS COUNT: 23 BEATS
QRS DURATION: 78 MS
QRS DURATION: 84 MS
QT INTERVAL: 310 MS
QT INTERVAL: 410 MS
QTC CALCULATION(BAZETT): 429 MS
QTC CALCULATION(BAZETT): 473 MS
QTC FREDERICIA: 411 MS
QTC FREDERICIA: 423 MS
R AXIS: -23 DEGREES
R AXIS: 40 DEGREES
T AXIS: 44 DEGREES
T AXIS: 70 DEGREES
T OFFSET: 371 MS
T OFFSET: 419 MS
VENTRICULAR RATE: 140 BPM
VENTRICULAR RATE: 66 BPM

## 2025-07-02 NOTE — TELEPHONE ENCOUNTER
Attempted to call regarding Structural Heart referral. Called unable to be completed. Tried alternate number [phone] & not in service.

## 2025-07-03 NOTE — SIGNIFICANT EVENT
Follow Up Phone Call    Outgoing phone call    Spoke to: Taqueria HAWLEY Danny Relationship:self   Phone number: [phone]      Outcome: No answer/busy signal   Chief Complaint   Patient presents with    Chest Pain     BIBA for chest pain that started suddenly while the patient was mowing the lawn. He began sweating, his head hurt, and the chest pain worsened and moved into abdomen. The patient states that he fell to the ground. Unclear if he hit his head. Did not lose consciousness. Per squad, HR was 155 and IVF were administered by EMS. Patient is deaf, sister at bedside.           Diagnosis:Not applicable    Calling restrictions unable to contact.

## 2025-07-09 ENCOUNTER — TELEPHONE (OUTPATIENT)
Dept: CARDIOLOGY | Facility: HOSPITAL | Age: 50
End: 2025-07-09
Payer: MEDICARE

## 2025-07-16 ENCOUNTER — APPOINTMENT (OUTPATIENT)
Dept: RADIOLOGY | Facility: HOSPITAL | Age: 50
End: 2025-07-16
Payer: MEDICARE

## 2025-07-16 ENCOUNTER — APPOINTMENT (OUTPATIENT)
Dept: CARDIOLOGY | Facility: HOSPITAL | Age: 50
End: 2025-07-16
Payer: MEDICARE

## 2025-07-16 ENCOUNTER — HOSPITAL ENCOUNTER (EMERGENCY)
Facility: HOSPITAL | Age: 50
Discharge: HOME | End: 2025-07-17
Attending: STUDENT IN AN ORGANIZED HEALTH CARE EDUCATION/TRAINING PROGRAM
Payer: MEDICARE

## 2025-07-16 DIAGNOSIS — R07.9 CHEST PAIN, UNSPECIFIED TYPE: Primary | ICD-10-CM

## 2025-07-16 DIAGNOSIS — Z00.8 ENCOUNTER FOR PSYCHOLOGICAL EVALUATION: ICD-10-CM

## 2025-07-16 LAB
ALBUMIN SERPL BCP-MCNC: 4.4 G/DL (ref 3.4–5)
ALP SERPL-CCNC: 65 U/L (ref 33–120)
ALT SERPL W P-5'-P-CCNC: 47 U/L (ref 10–52)
AMPHETAMINES UR QL SCN: NORMAL
ANION GAP BLDV CALCULATED.4IONS-SCNC: 11 MMOL/L (ref 10–25)
ANION GAP SERPL CALC-SCNC: 15 MMOL/L (ref 10–20)
APAP SERPL-MCNC: <10 UG/ML (ref ?–30)
APPEARANCE UR: CLEAR
APTT PPP: 35 SECONDS (ref 26–36)
AST SERPL W P-5'-P-CCNC: 27 U/L (ref 9–39)
BARBITURATES UR QL SCN: NORMAL
BASE EXCESS BLDV CALC-SCNC: 1.2 MMOL/L (ref -2–3)
BASOPHILS # BLD AUTO: 0.05 X10*3/UL (ref 0–0.1)
BASOPHILS NFR BLD AUTO: 0.7 %
BENZODIAZ UR QL SCN: NORMAL
BILIRUB SERPL-MCNC: 0.5 MG/DL (ref 0–1.2)
BILIRUB UR STRIP.AUTO-MCNC: NEGATIVE MG/DL
BODY TEMPERATURE: ABNORMAL
BUN SERPL-MCNC: 25 MG/DL (ref 6–23)
BZE UR QL SCN: NORMAL
CA-I BLDV-SCNC: 1.21 MMOL/L (ref 1.1–1.33)
CALCIUM SERPL-MCNC: 9.5 MG/DL (ref 8.6–10.3)
CANNABINOIDS UR QL SCN: NORMAL
CARDIAC TROPONIN I PNL SERPL HS: 8 NG/L (ref 0–20)
CARDIAC TROPONIN I PNL SERPL HS: 9 NG/L (ref 0–20)
CHLORIDE BLDV-SCNC: 102 MMOL/L (ref 98–107)
CHLORIDE SERPL-SCNC: 101 MMOL/L (ref 98–107)
CO2 SERPL-SCNC: 25 MMOL/L (ref 21–32)
COLOR UR: NORMAL
CREAT SERPL-MCNC: 0.99 MG/DL (ref 0.5–1.3)
EGFRCR SERPLBLD CKD-EPI 2021: >90 ML/MIN/1.73M*2
EOSINOPHIL # BLD AUTO: 0.34 X10*3/UL (ref 0–0.7)
EOSINOPHIL NFR BLD AUTO: 4.4 %
ERYTHROCYTE [DISTWIDTH] IN BLOOD BY AUTOMATED COUNT: 13.6 % (ref 11.5–14.5)
ETHANOL SERPL-MCNC: <10 MG/DL
FENTANYL+NORFENTANYL UR QL SCN: NORMAL
GLUCOSE BLD MANUAL STRIP-MCNC: 125 MG/DL (ref 74–99)
GLUCOSE BLDV-MCNC: 124 MG/DL (ref 74–99)
GLUCOSE SERPL-MCNC: 128 MG/DL (ref 74–99)
GLUCOSE UR STRIP.AUTO-MCNC: NORMAL MG/DL
HCO3 BLDV-SCNC: 25 MMOL/L (ref 22–26)
HCT VFR BLD AUTO: 36.5 % (ref 41–52)
HCT VFR BLD EST: 38 % (ref 41–52)
HGB BLD-MCNC: 11.9 G/DL (ref 13.5–17.5)
HGB BLDV-MCNC: 12.5 G/DL (ref 13.5–17.5)
IMM GRANULOCYTES # BLD AUTO: 0.02 X10*3/UL (ref 0–0.7)
IMM GRANULOCYTES NFR BLD AUTO: 0.3 % (ref 0–0.9)
INHALED O2 CONCENTRATION: 21 %
INR PPP: 1.1 (ref 0.9–1.1)
KETONES UR STRIP.AUTO-MCNC: NEGATIVE MG/DL
LACTATE BLDV-SCNC: 0.8 MMOL/L (ref 0.4–2)
LEUKOCYTE ESTERASE UR QL STRIP.AUTO: NEGATIVE
LYMPHOCYTES # BLD AUTO: 2.66 X10*3/UL (ref 1.2–4.8)
LYMPHOCYTES NFR BLD AUTO: 34.8 %
MCH RBC QN AUTO: 26.8 PG (ref 26–34)
MCHC RBC AUTO-ENTMCNC: 32.6 G/DL (ref 32–36)
MCV RBC AUTO: 82 FL (ref 80–100)
METHADONE UR QL SCN: NORMAL
MONOCYTES # BLD AUTO: 0.8 X10*3/UL (ref 0.1–1)
MONOCYTES NFR BLD AUTO: 10.5 %
NEUTROPHILS # BLD AUTO: 3.78 X10*3/UL (ref 1.2–7.7)
NEUTROPHILS NFR BLD AUTO: 49.3 %
NITRITE UR QL STRIP.AUTO: NEGATIVE
NRBC BLD-RTO: 0 /100 WBCS (ref 0–0)
OPIATES UR QL SCN: NORMAL
OXYCODONE+OXYMORPHONE UR QL SCN: NORMAL
OXYHGB MFR BLDV: 84.5 % (ref 45–75)
PCO2 BLDV: 36 MM HG (ref 41–51)
PCP UR QL SCN: NORMAL
PH BLDV: 7.45 PH (ref 7.33–7.43)
PH UR STRIP.AUTO: 6.5 [PH]
PLATELET # BLD AUTO: 230 X10*3/UL (ref 150–450)
PO2 BLDV: 50 MM HG (ref 35–45)
POTASSIUM BLDV-SCNC: 4 MMOL/L (ref 3.5–5.3)
POTASSIUM SERPL-SCNC: 4 MMOL/L (ref 3.5–5.3)
PROT SERPL-MCNC: 6.9 G/DL (ref 6.4–8.2)
PROT UR STRIP.AUTO-MCNC: NEGATIVE MG/DL
PROTHROMBIN TIME: 11.7 SECONDS (ref 9.8–12.4)
RBC # BLD AUTO: 4.44 X10*6/UL (ref 4.5–5.9)
RBC # UR STRIP.AUTO: NEGATIVE MG/DL
SALICYLATES SERPL-MCNC: <3 MG/DL (ref ?–20)
SAO2 % BLDV: 87 % (ref 45–75)
SODIUM BLDV-SCNC: 134 MMOL/L (ref 136–145)
SODIUM SERPL-SCNC: 137 MMOL/L (ref 136–145)
SP GR UR STRIP.AUTO: 1.03
UROBILINOGEN UR STRIP.AUTO-MCNC: NORMAL MG/DL
WBC # BLD AUTO: 7.7 X10*3/UL (ref 4.4–11.3)

## 2025-07-16 PROCEDURE — 2500000004 HC RX 250 GENERAL PHARMACY W/ HCPCS (ALT 636 FOR OP/ED): Mod: JZ | Performed by: STUDENT IN AN ORGANIZED HEALTH CARE EDUCATION/TRAINING PROGRAM

## 2025-07-16 PROCEDURE — 2550000001 HC RX 255 CONTRASTS: Performed by: STUDENT IN AN ORGANIZED HEALTH CARE EDUCATION/TRAINING PROGRAM

## 2025-07-16 PROCEDURE — 36415 COLL VENOUS BLD VENIPUNCTURE: CPT | Performed by: STUDENT IN AN ORGANIZED HEALTH CARE EDUCATION/TRAINING PROGRAM

## 2025-07-16 PROCEDURE — 84132 ASSAY OF SERUM POTASSIUM: CPT | Performed by: STUDENT IN AN ORGANIZED HEALTH CARE EDUCATION/TRAINING PROGRAM

## 2025-07-16 PROCEDURE — 85025 COMPLETE CBC W/AUTO DIFF WBC: CPT | Performed by: STUDENT IN AN ORGANIZED HEALTH CARE EDUCATION/TRAINING PROGRAM

## 2025-07-16 PROCEDURE — 96374 THER/PROPH/DIAG INJ IV PUSH: CPT | Mod: 59

## 2025-07-16 PROCEDURE — 96375 TX/PRO/DX INJ NEW DRUG ADDON: CPT | Mod: 59

## 2025-07-16 PROCEDURE — 70450 CT HEAD/BRAIN W/O DYE: CPT | Performed by: STUDENT IN AN ORGANIZED HEALTH CARE EDUCATION/TRAINING PROGRAM

## 2025-07-16 PROCEDURE — 70450 CT HEAD/BRAIN W/O DYE: CPT

## 2025-07-16 PROCEDURE — 71275 CT ANGIOGRAPHY CHEST: CPT | Performed by: STUDENT IN AN ORGANIZED HEALTH CARE EDUCATION/TRAINING PROGRAM

## 2025-07-16 PROCEDURE — 84484 ASSAY OF TROPONIN QUANT: CPT | Performed by: STUDENT IN AN ORGANIZED HEALTH CARE EDUCATION/TRAINING PROGRAM

## 2025-07-16 PROCEDURE — 84075 ASSAY ALKALINE PHOSPHATASE: CPT | Performed by: STUDENT IN AN ORGANIZED HEALTH CARE EDUCATION/TRAINING PROGRAM

## 2025-07-16 PROCEDURE — 82947 ASSAY GLUCOSE BLOOD QUANT: CPT

## 2025-07-16 PROCEDURE — 81003 URINALYSIS AUTO W/O SCOPE: CPT | Mod: 59 | Performed by: STUDENT IN AN ORGANIZED HEALTH CARE EDUCATION/TRAINING PROGRAM

## 2025-07-16 PROCEDURE — 71275 CT ANGIOGRAPHY CHEST: CPT

## 2025-07-16 PROCEDURE — 74174 CTA ABD&PLVS W/CONTRAST: CPT | Performed by: STUDENT IN AN ORGANIZED HEALTH CARE EDUCATION/TRAINING PROGRAM

## 2025-07-16 PROCEDURE — 80307 DRUG TEST PRSMV CHEM ANLYZR: CPT | Performed by: STUDENT IN AN ORGANIZED HEALTH CARE EDUCATION/TRAINING PROGRAM

## 2025-07-16 PROCEDURE — 85610 PROTHROMBIN TIME: CPT | Performed by: STUDENT IN AN ORGANIZED HEALTH CARE EDUCATION/TRAINING PROGRAM

## 2025-07-16 PROCEDURE — 80320 DRUG SCREEN QUANTALCOHOLS: CPT | Performed by: STUDENT IN AN ORGANIZED HEALTH CARE EDUCATION/TRAINING PROGRAM

## 2025-07-16 PROCEDURE — 93005 ELECTROCARDIOGRAM TRACING: CPT

## 2025-07-16 PROCEDURE — 85730 THROMBOPLASTIN TIME PARTIAL: CPT | Performed by: STUDENT IN AN ORGANIZED HEALTH CARE EDUCATION/TRAINING PROGRAM

## 2025-07-16 PROCEDURE — 99285 EMERGENCY DEPT VISIT HI MDM: CPT | Mod: 25 | Performed by: STUDENT IN AN ORGANIZED HEALTH CARE EDUCATION/TRAINING PROGRAM

## 2025-07-16 RX ORDER — ONDANSETRON HYDROCHLORIDE 2 MG/ML
4 INJECTION, SOLUTION INTRAVENOUS ONCE
Status: COMPLETED | OUTPATIENT
Start: 2025-07-16 | End: 2025-07-16

## 2025-07-16 RX ADMIN — IOHEXOL 90 ML: 350 INJECTION, SOLUTION INTRAVENOUS at 21:44

## 2025-07-16 RX ADMIN — HYDROMORPHONE HYDROCHLORIDE 0.5 MG: 1 INJECTION, SOLUTION INTRAMUSCULAR; INTRAVENOUS; SUBCUTANEOUS at 22:03

## 2025-07-16 RX ADMIN — ONDANSETRON 4 MG: 2 INJECTION, SOLUTION INTRAMUSCULAR; INTRAVENOUS at 22:03

## 2025-07-16 SDOH — HEALTH STABILITY: MENTAL HEALTH: IN THE PAST FEW WEEKS, HAVE YOU FELT THAT YOU OR YOUR FAMILY WOULD BE BETTER OFF IF YOU WERE DEAD?: NO

## 2025-07-16 SDOH — HEALTH STABILITY: MENTAL HEALTH: IN THE PAST FEW WEEKS, HAVE YOU WISHED YOU WERE DEAD?: NO

## 2025-07-16 SDOH — HEALTH STABILITY: MENTAL HEALTH: SUICIDAL BEHAVIOR (LIFETIME): NO

## 2025-07-16 SDOH — ECONOMIC STABILITY: HOUSING INSECURITY: FEELS SAFE LIVING IN HOME: YES

## 2025-07-16 SDOH — HEALTH STABILITY: MENTAL HEALTH: NON-SPECIFIC ACTIVE SUICIDAL THOUGHTS (PAST 1 MONTH): NO

## 2025-07-16 SDOH — ECONOMIC STABILITY: GENERAL

## 2025-07-16 SDOH — HEALTH STABILITY: MENTAL HEALTH: HAVE YOU EVER TRIED TO KILL YOURSELF?: NO

## 2025-07-16 SDOH — HEALTH STABILITY: MENTAL HEALTH: IN THE PAST WEEK, HAVE YOU BEEN HAVING THOUGHTS ABOUT KILLING YOURSELF?: NO

## 2025-07-16 SDOH — HEALTH STABILITY: MENTAL HEALTH: WISH TO BE DEAD (PAST 1 MONTH): NO

## 2025-07-16 SDOH — HEALTH STABILITY: MENTAL HEALTH: DEPRESSION SYMPTOMS: NO PROBLEMS REPORTED OR OBSERVED.

## 2025-07-16 SDOH — HEALTH STABILITY: MENTAL HEALTH: ARE YOU HAVING THOUGHTS OF KILLING YOURSELF RIGHT NOW?: NO

## 2025-07-16 SDOH — HEALTH STABILITY: MENTAL HEALTH: ANXIETY SYMPTOMS: NO PROBLEMS REPORTED OR OBSERVED.

## 2025-07-16 ASSESSMENT — LIFESTYLE VARIABLES
HAVE PEOPLE ANNOYED YOU BY CRITICIZING YOUR DRINKING: NO
SUBSTANCE_ABUSE_PAST_12_MONTHS: NO
TOTAL SCORE: 0
EVER FELT BAD OR GUILTY ABOUT YOUR DRINKING: NO
HAVE YOU EVER FELT YOU SHOULD CUT DOWN ON YOUR DRINKING: NO
EVER HAD A DRINK FIRST THING IN THE MORNING TO STEADY YOUR NERVES TO GET RID OF A HANGOVER: NO
PRESCIPTION_ABUSE_PAST_12_MONTHS: NO

## 2025-07-16 ASSESSMENT — PAIN - FUNCTIONAL ASSESSMENT: PAIN_FUNCTIONAL_ASSESSMENT: UNABLE TO SELF-REPORT

## 2025-07-17 VITALS
WEIGHT: 220 LBS | DIASTOLIC BLOOD PRESSURE: 83 MMHG | HEIGHT: 70 IN | TEMPERATURE: 98.4 F | RESPIRATION RATE: 17 BRPM | BODY MASS INDEX: 31.5 KG/M2 | HEART RATE: 62 BPM | OXYGEN SATURATION: 96 % | SYSTOLIC BLOOD PRESSURE: 148 MMHG

## 2025-07-17 LAB
ATRIAL RATE: 67 BPM
HOLD SPECIMEN: NORMAL
P AXIS: 26 DEGREES
P OFFSET: 191 MS
P ONSET: 138 MS
PR INTERVAL: 150 MS
Q ONSET: 213 MS
QRS COUNT: 11 BEATS
QRS DURATION: 82 MS
QT INTERVAL: 386 MS
QTC CALCULATION(BAZETT): 407 MS
QTC FREDERICIA: 400 MS
R AXIS: -7 DEGREES
T AXIS: 69 DEGREES
T OFFSET: 406 MS
VENTRICULAR RATE: 67 BPM

## 2025-07-17 NOTE — DISCHARGE INSTRUCTIONS
Call tomorrow to schedule follow-up appointment.    [facility] [phone]

## 2025-07-17 NOTE — ED TRIAGE NOTES
Pt presented to the ED with c/o CP, ABD pain and stroke like symptoms. Pt drove himself to the police department on his tractor and wrote on paper that someone at his was having a heart attack. After further investigation the pt stated he felt like he was having a heart attack. Pt is unable to speak for an unknown cause at this time but can write. When EMS was transporting pt to ER he syncopized and then started to complain of RLQ abd pain. Unknown pt way of communicating at this time.
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
## 2025-07-17 NOTE — PROGRESS NOTES
EPAT - Social Work Psychiatric Assessment    Arrival Details  Mode of Arrival: Other (Comment) (PD)  Admission Source: Home  Admission Type: Voluntary  EPAT Assessment Start Date: 07/16/25  EPAT Assessment Start Time: 2324  Name of : Michelle Castanon    History of Present Illness  Admission Reason: psych eval  HPI: Pt is a 50 year old male presenting in ED with hx of deafness and chest pain. Pt chart and medical records reviewed.  Per provider note, pt rode to [place] on a tractor stating they were having a heart attack. Pt reported having pain in right side and per provider note, had an episode where they passed out . Per collateral from pt sister Paloma ([phone]), pt was upset and threatened to have police come to her home and shoot her and it made her feel unsafe. Pt sister noted that pt has a hx of losing temper and pt has never seen any mental health providers. Pt denied auditory or visual hallucinations. Pt denied any HI or SI or SH. Pt reported that they did not mean what they said to sister and said it out of anger. Pt reported that family has hx of disagreements.    SW Readmission Information   Readmission within 30 Days: Yes  Previous ED Visit Date and Reason : Pt was in hospital 6/28 for chest pains  Previous Discharge Date and Location: Pt was dc on 6/30 to home  Factors Contributing to  Readmission Inpatient/ED (Team Perspective): Discharge Plan Did Not Meet Patient Needs  Readmission Factors Team Comments: N/A  Factors Contributing to Readmission (Patient/Family Perspective): Per pt sister, pt does not understand heart issues so goes to hospital whenever they have pain  Readmission From: Home  Where Did You Go When You Left the Hospitals Last Time: home  Was Family/Friend Involved in Your Discharge Plan: Yes  Did You Feel Ready to Leave the Hospital When You Were Discharged: Yes  Did You Feel Your Questions Were Answered and Concerns Addressed Before Discharged: Yes  Were You Able to Get Your  Medications: Yes  What Prevented You From Getting the Medication: N/A  What is Your Understanding of Your Medications: Pt understands to take medications as prescribed  Did You Take Your Medications as Prescribed: Yes  Reasons for Not Taking the Medications: N/A  Did You Have Follow-Up Appointments Scheduled: Yes  Did You Keep the Appointment: Yes  What Prevented You From Going to the Appointments: N/A  When Did You Start Not Feeling Well: today  Did You Contact Anyone to Tell Them How You Fairdealing: Yes  What Do You Think Would Have Helped to Prevent You From Readmission: Pt unsure  Did You Get the Care You Fairdealing You Needed From the Facility: Yes  What Could Have Been Better: N/A    Psychiatric Symptoms  Anxiety Symptoms: No problems reported or observed.  Depression Symptoms: No problems reported or observed.  Skye Symptoms: No problems reported or observed.    Psychosis Symptoms  Hallucination Type: No problems reported or observed.  Delusion Type: No problems reported or observed.    Additional Symptoms - Adult  Generalized Anxiety Disorder: No problems reported or observed.  Obsessive Compulsive Disorder: No problems reported or observed.  Panic Attack: No problems reported or observed.  Post Traumatic Stress Disorder: No problems reported or observed.  Delirium: No problems reported or observed.  Review of Symptoms Comments: see above    Past Psychiatric History/Meds/Treatments  Past Psychiatric History: Pt has no past pysch hx, pt has never seen a mental health provider  Past Psychiatric Meds/Treatments: Pt has no hx of either  Past Violence/Victimization History: Pt gets upset and loses temper and yells at sister amd at times will threaten them. Pt has no hx of acting on it    Current Mental Health Contacts   Name/Phone Number: N/A   Last Appointment Date: N/A  Provider Name/Phone Number: N/A  Provider Last Appointment Date: N/A    Support System: Immediate family    Living Arrangement:  House    Home Safety  Feels Safe Living in Home: Yes  Potentially Unsafe Housing Conditions: Unable to Assess  Home Safety : Pt stated that they feel safe at home    Income Information  Employment Status for: Patient  Employment Status: Disabled  Income Source: Unable to Assess  Current/Previous Occupation: Unable to Assess  Shift Worked:  (-)  Income/Expense Information: Income meets expenses  Financial Concerns: None  Who Manages Finances if Patient Unable: patient  Employment/ Finance Comments: N/A    [employer] Service/Education History  Current or Previous  Service: None   Experience: Other (Comment) (-)  Education Level: High school  History of Learning Problems: Yes (Per sister,)  History of School Behavior Problems: No  School History: Per sister, pt has hx of lower cognitive functioning    Social/Cultural History  Social History: Pt has close relationship and is supported by siblings  Cultural Requests During Hospitalization: N/A  Spiritual Requests During Hospitalization: N/A  Important Activities: Hobbies, Family    Legal  Legal Considerations: Patient/ Family Ability to Make Healthcare Decisions  Assistance with Managing/Advocating Healthcare Needs: Other (Comment) (N/A)  Criminal Activity/ Legal Involvement Pertinent to Current Situation/ Hospitalization: none  Legal Concerns: none  Legal Comments: see above    Drug Screening  Have you used any substances (canabis, cocaine, heroin, hallucinogens, inhalants, etc.) in the past 12 months?: No  Have you used any prescription drugs other than prescribed in the past 12 months?: No  Is a toxicology screen needed?: Yes    Stage of Change  Stage of Change: Precontemplation  History of Treatment:  (N/A)  Type of Treatment Offered:  (N/A)  Treatment Offered: Declined  Duration of Substance Use: Pt does not use substances  Frequency of Substance Use: N/A  Age of First Substance Use: N/A    Behavioral Health  Behavioral Health(WDL): Within Defined  Limits    Orientation  Orientation Level: Oriented X4, Inappropriate for developmental age    General Appearance  Motor Activity: Unremarkable  Speech Pattern: Other (Comment) (unremarkable)  General Attitude: Cooperative  Appearance/Hygiene: Unremarkable    Thought Process  Coherency: Other (Comment) (coherent)  Content: Unremarkable  Delusions: Other (Comment) (no delusions)  Perception: Not altered  Hallucination: None  Judgment/Insight: Other (Comment) (unremarkable)  Confusion: None  Cognition: Appropriate attention/concentration    Sleep Pattern  Sleep Pattern: Sleeps all night    Risk Factors  Self Harm/Suicidal Ideation Plan: Pt has no hx of SH or SI  Previous Self Harm/Suicidal Plans: Pt has no hx of previous SH or SI  Risk Factors: Lower IQ, Male  Description of Thoughts/Ideas Leaving Unit Now: Pt agreeable to plan of care    Violence Risk Assessment  Assessment of Violence: None noted  Thoughts of Harm to Others: No    Ability to Assess Risk Screen  Risk Screen - Ability to Assess: Able to be screened  Ask Suicide-Screening Questions  1. In the past few weeks, have you wished you were dead?: No  2. In the past few weeks, have you felt that you or your family would be better off if you were dead?: No  3. In the past week, have you been having thoughts about killing yourself?: No  4. Have you ever tried to kill yourself?: No  5. Are you having thoughts of killing yourself right now?: No  Calculated Risk Score: No intervention is necessary  [place] Suicide Severity Rating Scale (Screener/Recent Self-Report)  1. Wish to be Dead (Past 1 Month): No  2. Non-Specific Active Suicidal Thoughts (Past 1 Month): No  6. Suicidal Behavior (Lifetime): No  Calculated C-SSRS Risk Score (Lifetime/Recent): No Risk Indicated  Step 1: Risk Factors  Current & Past Psychiatric Dx: Other (Comment) (None)  Presenting Symptoms: Impulsivity  Family History: Other (Comment) (None reported)  Precipitants/Stressors: Triggering events  leading to humiliation, shame, and/or despair (e.g. loss of relationship, financial or health status) (real or anticipated)  Change in Treatment: Non-compliant or not receiving treatment  Access to Lethal Methods : No  Step 2: Protective Factors   Protective Factors Internal: Ability to cope with stress, Identifies reasons for living  Protective Factors External: Cultural, spiritual and/or moral attitudes against suicide, Supportive social network or family or friends  Step 3: Suicidal Ideation Intensity  Most Severe Suicidal Ideation Identified: Pt has no hx of SI  How Many Times Have You Had These Thoughts: Less than once a week  When You Have the Thoughts How Long do They Last : Fleeting - few seconds or minutes  Could/Can You Stop Thinking About Killing Yourself or Wanting to Die if You Want to: Easily able to control thoughts  Are There Things - Anyone or Anything - That Stopped You From Wanting to Die or Acting on: Does not apply  What Sort of Reasons Did You Have For Thinking About Wanting to Die or Killing Yourself: Does not apply  Total Score: 3  Step 5: Documentation  Risk Level: Low suicide risk    Psychiatric Impression and Plan of Care  Assessment and Plan: Pt is a 50 year old male presenting in ED with hx of deafness and chest pain. Pt chart and medical records reviewed.  Per provider note, pt rode to [employer] on a tractor stating they were having a heart attack. Pt reported having pain in right side and per provider note, had an episode where they passed out . Per collateral from pt sister Paloma ([phone]), pt was upset and threatened to have police come to her home and shoot her and it made her feel unsafe. Pt sister noted that pt has a hx of losing temper and pt has never seen any mental health providers. Pt denied auditory or visual hallucinations. Pt denied any HI or SI or SH. Pt reported that they did not mean what they said to sister and said it out of anger. Pt reported that family has hx of  disagreements. Pt low risk due to no hx of SI/SH and having no current SI/SH, pt is able to identify reasons for living and reports being in the ED only for chest pains. Pt not recommended for inpatient, Dr. Shah agrees.  Specific Resources Provided to Patient: group home resources  CM Notified: N/A  PHP/IOP Recommended: N/A  Specific Information Provided for PHP/IOP: N/A  Plan Comments: see above    Outcome/Disposition  Patient's Perception of Outcome Achieved: Pt agreeable to plan of care, pt to dc  Assessment, Recommendations and Risk Level Reviewed with: Dr Shah  Contact Name: -  Contact Number(s): -  Contact Relationship: -  EPAT Assessment Completed Date: 07/17/25  EPAT Assessment Completed Time: 0143  Patient Disposition: Home

## 2025-07-17 NOTE — PROGRESS NOTES
Social Work Note  I have had a discussion with the patient about warning signs that their condition is worsening, and they should consider returning to the ED and/or calling 911    The patient has identified appropriate internal coping strategies and has people and social settings that provide distraction and support.    The patient has a person who they can talk to in a crisis.  I have offered to contact this individual  No - patient has declined that I reach out.

## 2025-07-19 LAB
Q ONSET: 216 MS
QRS COUNT: 23 BEATS
QRS DURATION: 78 MS
QT INTERVAL: 310 MS
QTC CALCULATION(BAZETT): 473 MS
QTC FREDERICIA: 411 MS
R AXIS: 40 DEGREES
T AXIS: 70 DEGREES
T OFFSET: 371 MS
VENTRICULAR RATE: 140 BPM

## 2025-07-22 ENCOUNTER — OFFICE VISIT (OUTPATIENT)
Dept: CARDIOLOGY | Facility: HOSPITAL | Age: 50
End: 2025-07-22
Payer: MEDICARE

## 2025-07-22 VITALS
SYSTOLIC BLOOD PRESSURE: 109 MMHG | DIASTOLIC BLOOD PRESSURE: 71 MMHG | BODY MASS INDEX: 32.04 KG/M2 | WEIGHT: 223.33 LBS | OXYGEN SATURATION: 97 % | HEART RATE: 57 BPM

## 2025-07-22 DIAGNOSIS — I48.91 ATRIAL FIBRILLATION WITH RAPID VENTRICULAR RESPONSE (MULTI): Primary | ICD-10-CM

## 2025-07-22 DIAGNOSIS — R06.09 DOE (DYSPNEA ON EXERTION): ICD-10-CM

## 2025-07-22 DIAGNOSIS — Q23.81 BICUSPID AORTIC VALVE: ICD-10-CM

## 2025-07-22 DIAGNOSIS — I10 PRIMARY HYPERTENSION: ICD-10-CM

## 2025-07-22 DIAGNOSIS — I35.0 NONRHEUMATIC AORTIC VALVE STENOSIS: ICD-10-CM

## 2025-07-22 PROCEDURE — 99212 OFFICE O/P EST SF 10 MIN: CPT

## 2025-07-22 PROCEDURE — G2211 COMPLEX E/M VISIT ADD ON: HCPCS | Performed by: STUDENT IN AN ORGANIZED HEALTH CARE EDUCATION/TRAINING PROGRAM

## 2025-07-22 PROCEDURE — 99215 OFFICE O/P EST HI 40 MIN: CPT | Performed by: STUDENT IN AN ORGANIZED HEALTH CARE EDUCATION/TRAINING PROGRAM

## 2025-07-22 PROCEDURE — 3078F DIAST BP <80 MM HG: CPT | Performed by: STUDENT IN AN ORGANIZED HEALTH CARE EDUCATION/TRAINING PROGRAM

## 2025-07-22 PROCEDURE — 3074F SYST BP LT 130 MM HG: CPT | Performed by: STUDENT IN AN ORGANIZED HEALTH CARE EDUCATION/TRAINING PROGRAM

## 2025-07-22 RX ORDER — METOPROLOL TARTRATE 25 MG/1
25 TABLET, FILM COATED ORAL 2 TIMES DAILY
Qty: 180 TABLET | Refills: 1 | Status: SHIPPED | OUTPATIENT
Start: 2025-07-22

## 2025-07-22 RX ORDER — PAROXETINE 30 MG/1
30 TABLET, FILM COATED ORAL 2 TIMES DAILY
COMMUNITY

## 2025-07-22 RX ORDER — LISINOPRIL 10 MG/1
10 TABLET ORAL DAILY
COMMUNITY
End: 2025-07-22 | Stop reason: SDUPTHER

## 2025-07-22 RX ORDER — GLIMEPIRIDE 4 MG/1
4 TABLET ORAL
COMMUNITY

## 2025-07-22 RX ORDER — BUSPIRONE HYDROCHLORIDE 15 MG/1
TABLET ORAL 2 TIMES DAILY
COMMUNITY

## 2025-07-22 RX ORDER — LEVETIRACETAM 750 MG/1
750 TABLET ORAL DAILY
COMMUNITY

## 2025-07-22 RX ORDER — LISINOPRIL 10 MG/1
10 TABLET ORAL DAILY
Qty: 90 TABLET | Refills: 1 | Status: SHIPPED | OUTPATIENT
Start: 2025-07-22

## 2025-07-22 RX ORDER — SIMVASTATIN 80 MG/1
TABLET, FILM COATED ORAL NIGHTLY
COMMUNITY

## 2025-07-22 NOTE — PROGRESS NOTES
Primary Care Physician: Kamaljit Ortega MD   Date of Visit: 2025 11:40 AM EDT  Type of Visit: post dc follow up      Chief Complaint:  Chief Complaint   Patient presents with    Hospital Follow-up        HPI  Taqueria Delgado 50 y.o. male with PMH of epilepsy, type II DM on metformin and HTN. He has hearing loss and communicates via sign language admitted 2025 with chest pain, dizziness, found to be in afib with RVR. He underwent SHARON/DCV and NSR was successfully restored, SHARON showed severe bicuspid aortic stenosis, RASHMI 0.95cm2 by 2D planimetry. 2D echo with low normal LVEF with moderate to severe Aortic stenosis      He is here today for post dc follow up      He continues to have sob and fatigue with exertion, but he remains active  He is accompanied by his sister  No dizziness or syncope  No bleeding issues  He had rt sided chest wall pain, related to fall he had  HR in 60s range  No afib recurrent episodes   Does not check his BP   Dad had bicuspid valve, aortic aneurysms and  from that.    Review of Systems   Review of Systems   12 points review of systems are negative expect for the above    Social History:  Social History     Socioeconomic History    Marital status: Single     Spouse name: Not on file    Number of children: Not on file    Years of education: Not on file    Highest education level: Not on file   Occupational History    Not on file   Tobacco Use    Smoking status: Never    Smokeless tobacco: Never   Substance and Sexual Activity    Alcohol use: Not on file    Drug use: Not on file    Sexual activity: Not on file   Other Topics Concern    Not on file   Social History Narrative    Not on file     Social Drivers of Health     Financial Resource Strain: Low Risk  (2025)    Overall Financial Resource Strain (CARDIA)     Difficulty of Paying Living Expenses: Not hard at all   Food Insecurity: No Food Insecurity (2025)    Hunger Vital Sign     Worried About Running  Out of Food in the Last Year: Never true     Ran Out of Food in the Last Year: Never true   Transportation Needs: No Transportation Needs (6/29/2025)    PRAPARE - Transportation     Lack of Transportation (Medical): No     Lack of Transportation (Non-Medical): No   Physical Activity: Not on file   Stress: Not on file   Social Connections: Not on file   Intimate Partner Violence: Not At Risk (6/29/2025)    Humiliation, Afraid, Rape, and Kick questionnaire     Fear of Current or Ex-Partner: No     Emotionally Abused: No     Physically Abused: No     Sexually Abused: No   Housing Stability: Low Risk  (6/29/2025)    Housing Stability Vital Sign     Unable to Pay for Housing in the Last Year: No     Number of Times Moved in the Last Year: 0     Homeless in the Last Year: No        Past Medical History:  Medical History[1]    Past Surgical History:  Surgical History[2]    Family History:  Family History[3]     Objective:       7/17/2025     5:00 AM 7/17/2025     5:11 AM 7/17/2025     6:00 AM 7/17/2025     6:19 AM 7/17/2025     7:00 AM 7/17/2025     8:00 AM 7/22/2025    11:40 AM   Vitals   Systolic       109   Diastolic       71   Heart Rate 60 65 59 63 65 62 57   Weight (lb)       223.33   BMI       32.04 kg/m2   BSA (m2)       2.23 m2   Visit Report       Report      Constitutional:       Appearance: Healthy appearance. Not in distress.   Neck:      Vascular:  JVD normal.   Pulmonary:      Effort: Pulmonary effort is normal.      Breath sounds: Normal breath sounds. No wheezing. No rhonchi. No rales.   Cardiovascular:      Normal rate. Regular rhythm. Normal S1. Normal S2.       Murmurs: There is 5/6 murmur.      No gallop.  N No rub.   Pulses:     Intact distal pulses.   Edema:     Peripheral edema absent.   Abdominal:      General: Bowel sounds are normal.      Palpations: Abdomen is soft.      Tenderness: There is no abdominal tenderness.   Musculoskeletal: Normal range of motion.         General: No tenderness.    Skin:     General: Skin is warm and dry.   Neurological:      General: No focal deficit present.      Mental Status: Alert and oriented to person, place and time.   Psychiatry:     Normal Mood     Allergies:  Allergies[4]    Medications:  Current Outpatient Medications   Medication Instructions    apixaban (ELIQUIS) 5 mg, oral, Every 12 hours    busPIRone (Buspar) 15 mg tablet 2 times daily    glimepiride (AMARYL) 4 mg, Daily before breakfast    levETIRAcetam (KEPPRA) 750 mg, Daily    lisinopril 10 mg, oral, Daily    metFORMIN (GLUCOPHAGE) 500 mg, oral, 2 times daily (morning and late afternoon)    metoprolol tartrate (LOPRESSOR) 25 mg, oral, 2 times daily    PARoxetine (PAXIL) 30 mg, 2 times daily    simvastatin (Zocor) 80 mg tablet Nightly        Labs and Imaging:     I reviewed the following labs  Lab Results   Component Value Date    WBC 7.7 07/16/2025    HGB 11.9 (L) 07/16/2025    HCT 36.5 (L) 07/16/2025     07/16/2025    CHOL 220 (H) 06/30/2025    TRIG 145 06/30/2025    HDL 52.2 06/30/2025    ALT 47 07/16/2025    AST 27 07/16/2025     07/16/2025    K 4.0 07/16/2025     07/16/2025    CREATININE 0.99 07/16/2025    BUN 25 (H) 07/16/2025    CO2 25 07/16/2025    TSH 1.83 06/29/2025    PSA 0.28 07/11/2023    INR 1.1 07/16/2025    HGBA1C 8.3 (H) 06/29/2025       I reviewed the following:  EKG:   Recent Labs     06/30/25  1500 06/28/25 2015 06/28/25 2013   ATRRATE 66  --  67   VENTRATE 66 140 67   PRINT 150  --  150   QRSDUR 84 78 82   QTCFRED 423 411 400   QTCCALCB 429 473 407     Encounter Date: 06/28/25   ECG 12 Lead   Result Value    Ventricular Rate 66    Atrial Rate 66    NY Interval 150    QRS Duration 84    QT Interval 410    QTC Calculation(Bazett) 429    P Axis 48    R Axis -23    T Axis 44    QRS Count 11    Q Onset 214    P Onset 139    P Offset 189    T Offset 419    QTC Fredericia 423    Narrative    Normal sinus rhythm  Possible Left atrial enlargement  Inferior infarct , age  undetermined  Abnormal ECG  When compared with ECG of 28-JUN-2025 19:51, (unconfirmed)  Sinus rhythm has replaced Atrial fibrillation  Vent. rate has decreased BY  74 BPM  Inferior infarct is now Present     Echocardiogram:   Recent Labs     06/30/25  1707 06/30/25  1526   EF 53 53   LVIDD 4.28  --    RV 33  --    RVFRWALLPKSP 10.00  --    TAPSE 1.1  --    Transthoracic Echo (TTE) Complete With Contrast 06/30/2025    [facility], [address] [place] [address]  Tel [phone] and Fax [phone]    TRANSTHORACIC ECHOCARDIOGRAM REPORT      Patient Name:       CA HAWLEY            Reading Physician:    [id number] Agustin Alston MD  Study Date:         6/30/2025           Ordering Provider:    [id number] DELANO SMITH  MRN/PID:            [id number]            Fellow:  Accession#:         [id number]        Nurse:                Hiwot Thibodeaux RN  Date of Birth/Age:  1975 / 50      Sonographer:          Elsie crawford RDCS  Gender assigned at                     Additional Staff:  Birth:  Height:                                 Admit Date:           6/28/2025  Weight:                                 Admission Status:     Inpatient -  Routine  BSA / BMI:          m2 / kg/m2          Encounter#:           [id number]  Blood Pressure:     126/92 mmHg         Department Location:  [facility] Non  Invasive    Study Type:    TRANSTHORACIC ECHO (TTE) COMPLETE  Diagnosis/ICD: Unspecified atrial fibrillation-I48.91  Indication:    Afib  CPT Code:      Echo Complete w Full Doppler-[id number]    Patient History:  Pertinent History: A-Fib and DM, Epilepsy.    Study Detail: The following Echo studies were performed: 2D, M-Mode, Doppler and  color flow. Image quality for this study is poor. Technically  challenging study due to body habitus. Definity used as a contrast  agent for endocardial border definition. Total  contrast used for  this procedure was 2.0 mL via IV push.      PHYSICIAN INTERPRETATION:  Left Ventricle: The left ventricular systolic function is low normal with a visually estimated ejection fraction of 50-55%. There are no regional left ventricular wall motion abnormalities. The left ventricular cavity size is normal. There is moderately increased septal and mildly increased posterior left ventricular wall thickness. Spectral Doppler shows a normal pattern of left ventricular diastolic filling.  Left Atrium: The left atrial size is normal.  Right Ventricle: The right ventricle is normal in size. There is normal right ventricular global systolic function.  Right Atrium: The right atrium is normal in size.  Aortic Valve: The aortic valve was not well visualized. The aortic valve area by VTI is 0.72 cmï¿½ with a peak velocity of 3.25 m/s. The peak and mean gradients are 34 mmHg and 26 mmHg, respectively with a dimensionless index of 0.27. There is severe aortic valve cusp calcification. There is evidence of moderate to severe aortic valve stenosis. There is no evidence of aortic valve regurgitation. Valve appears abnormal. Malalignment of the doppler underestimates the severity of aortic stenosis.  Mitral Valve: The mitral valve is mildly thickened. The doppler estimated peak and mean diastolic pressure gradients are 2.3 mmHg and 1 mmHg, respectively. The mean gradient of the mitral valve is 1 mmHg. There is trace mitral valve regurgitation. The E Vmax is 0.69 m/s.  Tricuspid Valve: The tricuspid valve is structurally normal. There is trace tricuspid regurgitation. The right ventricular systolic pressure could not be estimated.  Pulmonic Valve: The pulmonic valve is not well visualized. The pulmonic valve regurgitation was not well visualized.  Pericardium: Trivial pericardial effusion.  Aorta: The aortic root is normal. The aortic root is at the upper limits of normal size.  Systemic Veins: The inferior vena cava  was not well visualized, IVC inspiratory collapse is not well visualized.      CONCLUSIONS:  1. The left ventricular systolic function is low normal with a visually estimated ejection fraction of 50-55%.  2. There is moderately increased septal thickness.  3. There is normal right ventricular global systolic function.  4. Moderate to severe aortic valve stenosis. The peak and mean gradients are 42 mmHg and 26 mmHg respectively.  5. There is severe aortic valve cusp calcification.    QUANTITATIVE DATA SUMMARY:    2D MEASUREMENTS:         Normal Ranges:  IVSd:            1.43 cm (0.6-1.1cm)  LVPWd:           1.21 cm (0.6-1.1cm)  LVIDd:           4.28 cm (3.9-5.9cm)  LVIDs:           3.23 cm  LV % FS          24.4 %      LEFT ATRIUM:                Normal Ranges:  LA Vol A4C:        48.1 ml  (22+/-6mL/m2)  LA Vol A2C:        49.9 ml  LA Vol BP:         51.7 ml  LA Area A4C:       16.3 cm2  LA Area A2C:       17.5 cm2  LA Major Axis A4C: 4.7 cm  LA Major Axis A2C: 5.2 cm  LA Vol A4C:        46.7 ml  LA Vol A2C:        46.2 ml      RIGHT ATRIUM:         Normal Ranges:  RA Area A4C:  9.5 cm2      AORTA MEASUREMENTS:         Normal Ranges:  Ao Sinus, d:        3.30 cm (2.1-3.5cm)  Asc Ao, d:          3.50 cm (2.1-3.4cm)      LV SYSTOLIC FUNCTION:  Normal Ranges:  EF-A4C View:    46 % (>=55%)  EF-A2C View:    50 %  EF-Biplane:     49 %  EF-Visual:      53 %  LV EF Reported: 53 %      LV DIASTOLIC FUNCTION:            Normal Ranges:  MV Peak E:             0.69 m/s   (0.7-1.2 m/s)  MV Peak A:             0.62 m/s   (0.42-0.7 m/s)  E/A Ratio:             1.11       (1.0-2.2)  MV e'                  0.075 m/s  (>8.0)  MV lateral e'          0.09 m/s  MV medial e'           0.06 m/s  MV A Dur:              97.05 msec  E/e' Ratio:            9.23       (<8.0)      MITRAL VALVE:          Normal Ranges:  MV Vmax:      0.76 m/s (<=1.3m/s)  MV peak P.3 mmHg (<5mmHg)  MV mean P.1 mmHg (<2mmHg)  MV VTI:       26.21 cm  (10-13cm)  MV DT:        213 msec (150-240msec)      AORTIC VALVE:                      Normal Ranges:  AoV Vmax:                3.25 m/s  (<=1.7m/s)  AoV Peak P.3 mmHg (<20mmHg)  AoV Mean P.1 mmHg (1.7-11.5mmHg)  LVOT Max Abel:            0.75 m/s  (<=1.1m/s)  AoV VTI:                 62.66 cm  (18-25cm)  LVOT VTI:                17.15 cm  LVOT Diameter:           1.82 cm   (1.8-2.4cm)  AoV Area, VTI:           0.72 cm2  (2.5-5.5cm2)  AoV Area,Vmax:           0.60 cm2  (2.5-4.5cm2)  AoV Dimensionless Index: 0.27      RIGHT VENTRICLE:  RV Basal 2.60 cm  RV Mid   1.90 cm  RV Major 7.6 cm  TAPSE:   11.0 mm  RV s'    0.10 m/s      TRICUSPID VALVE/RVSP:          Normal Ranges:  Peak TR Velocity:     2.74 m/s  Est. RA Pressure:     3  RV Syst Pressure:     33       (< 30mmHg)      PULMONIC VALVE:          Normal Ranges:  PV Max Abel:     0.6 m/s  (0.6-0.9m/s)  PV Max P.6 mmHg      AORTA:  Asc Ao Diam 3.50 cm      [id number] Agustin Alston MD  Electronically signed on 2025 at 9:59:21 AM        ** Final (Updated) **    Coronary Angiography: No results found for this or any previous visit from the past 1800 days.    Right Heart Cath: No results found for this or any previous visit from the past 1800 days.    Cardiac Scoring: No results found for this or any previous visit from the past 1800 days.    Cardiac MRI: No results found for this or any previous visit from the past 1800 days.    Nuclear:No results found for this or any previous visit from the past 1800 days.    Metabolic Stress: No results found for this or any previous visit from the past 1800 days.        The 10-year ASCVD risk score (Shyann OLSON, et al., 2019) is: 7%    Values used to calculate the score:      Age: 50 years      Clincally relevant sex: Male      Is Non- : No      Diabetic: Yes      Tobacco smoker: No      Systolic Blood Pressure: 109 mmHg      Is BP treated: Yes      HDL Cholesterol:  52.2 mg/dL      Total Cholesterol: 220 mg/dL     Assessment/Plan:   1. Atrial fibrillation with rapid ventricular response (Multi)  Referral to Cardiac Surgery    apixaban (Eliquis) 5 mg tablet    metoprolol tartrate (Lopressor) 25 mg tablet      2. Nonrheumatic aortic valve stenosis  Referral to Cardiac Surgery    Case Request Cath Lab: Left Heart Catheteriztion/Right heart Catheterization No Coronaries, With Left Ventriculography    Basic metabolic panel    CBC    Basic metabolic panel    CBC      3. Bicuspid aortic valve  Referral to Cardiac Surgery    Case Request Cath Lab: Left Heart Catheteriztion/Right heart Catheterization No Coronaries, With Left Ventriculography    lisinopril 10 mg tablet      4. POWELL (dyspnea on exertion)  Referral to Cardiac Surgery    Case Request Cath Lab: Left Heart Catheteriztion/Right heart Catheterization No Coronaries, With Left Ventriculography      5. Primary hypertension           Taqueria Delgado 50 y.o. male with PMH of epilepsy, type II DM on metformin and HTN. He has hearing loss and communicates via sign language admitted 6/29/2025 with chest pain, dizziness, found to be in afib with RVR. He underwent SHARON/DCV and NSR was successfully restored, SHARON showed severe bicuspid aortic stenosis, RASHMI 0.95cm2 by 2D planimetry. 2D echo with low normal LVEF with moderate to severe Aortic stenosis    He has symptomatic severe aortic stenosis. He has loud murmur on physical exam  BP and HR well controlled  No symptoms of recurrent afib    Plan  Continue BP and HR monitoring   Continue Eliquis    Continue metoprolol    Continue lisinopril   Continue simvastatin  Will get R/LHC, will hold eliquis 48 hrs prior to procedure    Will refer to surgery for AVR, MARCO clipping and MAZE   Recommend first degree relatives screening    # Cardiovascular Health Maintenance  - Healthy Diet: Avoid high fat and high sodium foods (processed meats / fast foods). Consider a mediterranean or DASH diet,  emphasizing vegetables, fruits, whole grains, lean proteins  - Avoidance of smoking and smoke exposure      MDM: high complex      ____________________________________________________________  Agustin Alston MD   of Medicine  Senior Attending Physician   Division of Cardiovascular Medicine   [facility] [place]  [facility]         [1] No past medical history on file.  [2] No past surgical history on file.  [3] No family history on file.  [4] No Known Allergies

## 2025-07-26 LAB
ATRIAL RATE: 67 BPM
P AXIS: 26 DEGREES
P OFFSET: 191 MS
P ONSET: 138 MS
PR INTERVAL: 150 MS
Q ONSET: 213 MS
QRS COUNT: 11 BEATS
QRS DURATION: 82 MS
QT INTERVAL: 386 MS
QTC CALCULATION(BAZETT): 407 MS
QTC FREDERICIA: 400 MS
R AXIS: -7 DEGREES
T AXIS: 69 DEGREES
T OFFSET: 406 MS
VENTRICULAR RATE: 67 BPM

## 2025-08-19 ENCOUNTER — APPOINTMENT (OUTPATIENT)
Dept: CARDIOLOGY | Facility: CLINIC | Age: 50
End: 2025-08-19
Payer: MEDICARE

## 2025-08-20 LAB
ANION GAP SERPL CALCULATED.4IONS-SCNC: 8 MMOL/L (CALC) (ref 7–17)
BUN SERPL-MCNC: 17 MG/DL (ref 7–25)
BUN/CREAT SERPL: ABNORMAL (CALC) (ref 6–22)
CALCIUM SERPL-MCNC: 9.2 MG/DL (ref 8.6–10.3)
CHLORIDE SERPL-SCNC: 105 MMOL/L (ref 98–110)
CO2 SERPL-SCNC: 23 MMOL/L (ref 20–32)
CREAT SERPL-MCNC: 0.92 MG/DL (ref 0.7–1.3)
EGFRCR SERPLBLD CKD-EPI 2021: 101 ML/MIN/1.73M2
ERYTHROCYTE [DISTWIDTH] IN BLOOD BY AUTOMATED COUNT: 13.2 % (ref 11–15)
GLUCOSE SERPL-MCNC: 280 MG/DL (ref 65–99)
HCT VFR BLD AUTO: 36.7 % (ref 38.5–50)
HGB BLD-MCNC: 11.9 G/DL (ref 13.2–17.1)
MCH RBC QN AUTO: 27.3 PG (ref 27–33)
MCHC RBC AUTO-ENTMCNC: 32.4 G/DL (ref 32–36)
MCV RBC AUTO: 84.2 FL (ref 80–100)
PLATELET # BLD AUTO: 205 THOUSAND/UL (ref 140–400)
PMV BLD REES-ECKER: 10 FL (ref 7.5–12.5)
POTASSIUM SERPL-SCNC: 4.5 MMOL/L (ref 3.5–5.3)
RBC # BLD AUTO: 4.36 MILLION/UL (ref 4.2–5.8)
SODIUM SERPL-SCNC: 136 MMOL/L (ref 135–146)
WBC # BLD AUTO: 4.9 THOUSAND/UL (ref 3.8–10.8)

## 2025-08-25 ENCOUNTER — HOSPITAL ENCOUNTER (OUTPATIENT)
Facility: HOSPITAL | Age: 50
Setting detail: OUTPATIENT SURGERY
Discharge: HOME | End: 2025-08-25
Attending: INTERNAL MEDICINE | Admitting: INTERNAL MEDICINE
Payer: MEDICARE

## 2025-08-25 VITALS
HEART RATE: 53 BPM | OXYGEN SATURATION: 99 % | DIASTOLIC BLOOD PRESSURE: 78 MMHG | SYSTOLIC BLOOD PRESSURE: 137 MMHG | WEIGHT: 222.66 LBS | RESPIRATION RATE: 16 BRPM | BODY MASS INDEX: 31.95 KG/M2

## 2025-08-25 DIAGNOSIS — Q23.81 BICUSPID AORTIC VALVE: ICD-10-CM

## 2025-08-25 DIAGNOSIS — I35.0 NONRHEUMATIC AORTIC VALVE STENOSIS: ICD-10-CM

## 2025-08-25 DIAGNOSIS — R06.09 DOE (DYSPNEA ON EXERTION): ICD-10-CM

## 2025-08-25 PROCEDURE — 7100000010 HC PHASE TWO TIME - EACH INCREMENTAL 1 MINUTE: Performed by: INTERNAL MEDICINE

## 2025-08-25 PROCEDURE — 7100000009 HC PHASE TWO TIME - INITIAL BASE CHARGE: Performed by: INTERNAL MEDICINE

## 2025-08-25 PROCEDURE — 99152 MOD SED SAME PHYS/QHP 5/>YRS: CPT | Performed by: INTERNAL MEDICINE

## 2025-08-25 PROCEDURE — 99153 MOD SED SAME PHYS/QHP EA: CPT | Performed by: INTERNAL MEDICINE

## 2025-08-25 PROCEDURE — C1760 CLOSURE DEV, VASC: HCPCS | Performed by: INTERNAL MEDICINE

## 2025-08-25 PROCEDURE — 2500000004 HC RX 250 GENERAL PHARMACY W/ HCPCS (ALT 636 FOR OP/ED): Performed by: INTERNAL MEDICINE

## 2025-08-25 PROCEDURE — 2720000007 HC OR 272 NO HCPCS: Performed by: INTERNAL MEDICINE

## 2025-08-25 PROCEDURE — C1894 INTRO/SHEATH, NON-LASER: HCPCS | Performed by: INTERNAL MEDICINE

## 2025-08-25 PROCEDURE — 93456 R HRT CORONARY ARTERY ANGIO: CPT | Performed by: INTERNAL MEDICINE

## 2025-08-25 PROCEDURE — 96373 THER/PROPH/DIAG INJ IA: CPT | Performed by: INTERNAL MEDICINE

## 2025-08-25 PROCEDURE — C1887 CATHETER, GUIDING: HCPCS | Performed by: INTERNAL MEDICINE

## 2025-08-25 PROCEDURE — 2550000001 HC RX 255 CONTRASTS: Performed by: INTERNAL MEDICINE

## 2025-08-25 RX ORDER — VERAPAMIL HYDROCHLORIDE 2.5 MG/ML
INJECTION INTRAVENOUS AS NEEDED
Status: DISCONTINUED | OUTPATIENT
Start: 2025-08-25 | End: 2025-08-25 | Stop reason: HOSPADM

## 2025-08-25 RX ORDER — NITROGLYCERIN 40 MG/100ML
INJECTION INTRAVENOUS AS NEEDED
Status: DISCONTINUED | OUTPATIENT
Start: 2025-08-25 | End: 2025-08-25 | Stop reason: HOSPADM

## 2025-08-25 RX ORDER — LIDOCAINE HYDROCHLORIDE 10 MG/ML
INJECTION, SOLUTION EPIDURAL; INFILTRATION; INTRACAUDAL; PERINEURAL AS NEEDED
Status: DISCONTINUED | OUTPATIENT
Start: 2025-08-25 | End: 2025-08-25 | Stop reason: HOSPADM

## 2025-08-25 RX ORDER — HEPARIN SODIUM 1000 [USP'U]/ML
INJECTION, SOLUTION INTRAVENOUS; SUBCUTANEOUS AS NEEDED
Status: DISCONTINUED | OUTPATIENT
Start: 2025-08-25 | End: 2025-08-25 | Stop reason: HOSPADM

## 2025-08-25 RX ORDER — MIDAZOLAM HYDROCHLORIDE 1 MG/ML
INJECTION, SOLUTION INTRAMUSCULAR; INTRAVENOUS AS NEEDED
Status: DISCONTINUED | OUTPATIENT
Start: 2025-08-25 | End: 2025-08-25 | Stop reason: HOSPADM

## 2025-08-25 RX ORDER — FENTANYL CITRATE 50 UG/ML
INJECTION, SOLUTION INTRAMUSCULAR; INTRAVENOUS AS NEEDED
Status: DISCONTINUED | OUTPATIENT
Start: 2025-08-25 | End: 2025-08-25 | Stop reason: HOSPADM

## 2025-08-25 ASSESSMENT — ENCOUNTER SYMPTOMS
EYES NEGATIVE: 1
HEMATOLOGIC/LYMPHATIC NEGATIVE: 1
CARDIOVASCULAR NEGATIVE: 1
GASTROINTESTINAL NEGATIVE: 1
ALLERGIC/IMMUNOLOGIC NEGATIVE: 1
ENDOCRINE NEGATIVE: 1
MUSCULOSKELETAL NEGATIVE: 1
NEUROLOGICAL NEGATIVE: 1
PSYCHIATRIC NEGATIVE: 1
SHORTNESS OF BREATH: 1
CONSTITUTIONAL NEGATIVE: 1

## 2025-08-25 ASSESSMENT — PAIN SCALES - GENERAL
PAINLEVEL_OUTOF10: 0 - NO PAIN

## 2025-08-25 ASSESSMENT — PAIN - FUNCTIONAL ASSESSMENT
PAIN_FUNCTIONAL_ASSESSMENT: 0-10

## 2025-09-02 ENCOUNTER — APPOINTMENT (OUTPATIENT)
Dept: CARDIOLOGY | Facility: CLINIC | Age: 50
End: 2025-09-02
Payer: MEDICARE

## 2025-09-04 ENCOUNTER — TELEMEDICINE (OUTPATIENT)
Dept: CARDIAC SURGERY | Facility: HOSPITAL | Age: 50
End: 2025-09-04
Payer: MEDICARE

## 2025-09-04 DIAGNOSIS — I35.0 NONRHEUMATIC AORTIC VALVE STENOSIS: ICD-10-CM

## 2025-09-04 DIAGNOSIS — I48.91 ATRIAL FIBRILLATION WITH RVR (MULTI): Primary | ICD-10-CM

## 2025-09-04 PROCEDURE — 99203 OFFICE O/P NEW LOW 30 MIN: CPT | Performed by: THORACIC SURGERY (CARDIOTHORACIC VASCULAR SURGERY)

## (undated) DEVICE — ANGIO KIT, LEFT HEART, LF, CUSTOM

## (undated) DEVICE — INTRODUCER SHEATH, GLIDESHEATH, 6FR 10CM

## (undated) DEVICE — CATHETER, WEDGE PRESSURE, BALLOON, DOUBLE LUMEN, 5 FR, 110 CM

## (undated) DEVICE — TR BAND, RADIAL COMPRESSION, LONG, 29CM

## (undated) DEVICE — GUIDEWIRE, INQWIRE, 3MM J, .035 X 210CM, FIXED

## (undated) DEVICE — CATHETER, ANGIO, IMPULSE, PIGTAIL, 6 FR X 110 CM

## (undated) DEVICE — CATHETER, DIAGNOSTIC, INIFNITI, 6 FR, JR 4.0

## (undated) DEVICE — CATHETER, ANGIO, IMPULSE, FL3.5, 6 FR X 100 CM

## (undated) DEVICE — INTRODUCER SHEATH, GLIDESHEATH, 5FR 10CM